# Patient Record
Sex: MALE | Race: WHITE | NOT HISPANIC OR LATINO | Employment: FULL TIME | URBAN - METROPOLITAN AREA
[De-identification: names, ages, dates, MRNs, and addresses within clinical notes are randomized per-mention and may not be internally consistent; named-entity substitution may affect disease eponyms.]

---

## 2023-06-26 ENCOUNTER — LAB (OUTPATIENT)
Dept: LAB | Facility: LAB | Age: 63
End: 2023-06-26
Payer: COMMERCIAL

## 2023-06-26 ENCOUNTER — OFFICE VISIT (OUTPATIENT)
Dept: PRIMARY CARE | Facility: CLINIC | Age: 63
End: 2023-06-26
Payer: COMMERCIAL

## 2023-06-26 VITALS
DIASTOLIC BLOOD PRESSURE: 76 MMHG | SYSTOLIC BLOOD PRESSURE: 124 MMHG | HEIGHT: 68 IN | BODY MASS INDEX: 18.16 KG/M2 | WEIGHT: 119.8 LBS

## 2023-06-26 DIAGNOSIS — E78.5 HYPERLIPIDEMIA, UNSPECIFIED HYPERLIPIDEMIA TYPE: ICD-10-CM

## 2023-06-26 DIAGNOSIS — E03.9 HYPOTHYROIDISM, UNSPECIFIED TYPE: ICD-10-CM

## 2023-06-26 DIAGNOSIS — J44.9 CHRONIC OBSTRUCTIVE PULMONARY DISEASE, UNSPECIFIED COPD TYPE (MULTI): ICD-10-CM

## 2023-06-26 DIAGNOSIS — G61.0: ICD-10-CM

## 2023-06-26 PROBLEM — F10.21 ALCOHOL USE DISORDER, SEVERE, IN SUSTAINED REMISSION (MULTI): Status: ACTIVE | Noted: 2023-06-26

## 2023-06-26 PROBLEM — I82.409 DVT (DEEP VENOUS THROMBOSIS) (MULTI): Status: ACTIVE | Noted: 2023-06-26

## 2023-06-26 PROBLEM — H25.012 CORTICAL AGE-RELATED CATARACT OF LEFT EYE: Status: ACTIVE | Noted: 2023-06-26

## 2023-06-26 PROBLEM — R26.81 UNSTEADY GAIT: Status: ACTIVE | Noted: 2023-06-26

## 2023-06-26 PROBLEM — R05.9 COUGH: Status: ACTIVE | Noted: 2023-06-26

## 2023-06-26 PROBLEM — R09.02 HYPOXIA: Status: ACTIVE | Noted: 2023-06-26

## 2023-06-26 PROBLEM — J31.0 CHRONIC RHINITIS: Status: ACTIVE | Noted: 2023-06-26

## 2023-06-26 PROBLEM — T14.8XXA NERVE DAMAGE: Status: ACTIVE | Noted: 2023-06-26

## 2023-06-26 PROBLEM — H25.12 AGE-RELATED NUCLEAR CATARACT OF LEFT EYE: Status: ACTIVE | Noted: 2023-06-26

## 2023-06-26 PROBLEM — M79.89 LEG SWELLING: Status: ACTIVE | Noted: 2023-06-26

## 2023-06-26 PROBLEM — R53.0 NEOPLASTIC MALIGNANT RELATED FATIGUE: Status: ACTIVE | Noted: 2023-06-26

## 2023-06-26 PROBLEM — J98.6 DIAPHRAGM DYSFUNCTION: Status: ACTIVE | Noted: 2023-06-26

## 2023-06-26 PROBLEM — H90.3 SENSORINEURAL HEARING LOSS OF BOTH EARS: Status: ACTIVE | Noted: 2023-06-26

## 2023-06-26 PROBLEM — H93.19 SUBJECTIVE TINNITUS: Status: ACTIVE | Noted: 2023-06-26

## 2023-06-26 PROBLEM — F15.90 STIMULANT USE DISORDER: Status: ACTIVE | Noted: 2023-06-26

## 2023-06-26 PROBLEM — F41.9 ANXIETY DISORDER: Status: ACTIVE | Noted: 2023-06-26

## 2023-06-26 PROBLEM — H25.042 POSTERIOR SUBCAPSULAR POLAR AGE-RELATED CATARACT OF LEFT EYE: Status: ACTIVE | Noted: 2023-06-26

## 2023-06-26 PROBLEM — F17.200 NICOTINE DEPENDENCE: Status: ACTIVE | Noted: 2023-06-26

## 2023-06-26 PROBLEM — Z96.1 PSEUDOPHAKIA OF LEFT EYE: Status: ACTIVE | Noted: 2023-06-26

## 2023-06-26 PROBLEM — C34.90 LUNG CANCER (MULTI): Status: ACTIVE | Noted: 2023-06-26

## 2023-06-26 PROBLEM — F90.1 ATTENTION DEFICIT HYPERACTIVITY DISORDER (ADHD), PREDOMINANTLY HYPERACTIVE TYPE: Status: ACTIVE | Noted: 2023-06-26

## 2023-06-26 PROBLEM — F39 MOOD DISORDER (CMS-HCC): Status: ACTIVE | Noted: 2023-06-26

## 2023-06-26 PROBLEM — Z85.118 HISTORY OF LUNG CANCER: Status: ACTIVE | Noted: 2023-06-26

## 2023-06-26 PROBLEM — R91.1 LUNG NODULE: Status: ACTIVE | Noted: 2023-06-26

## 2023-06-26 PROBLEM — J98.4 RESTRICTIVE LUNG DISEASE: Status: ACTIVE | Noted: 2023-06-26

## 2023-06-26 PROBLEM — F14.10 COCAINE USE DISORDER (MULTI): Status: ACTIVE | Noted: 2023-06-26

## 2023-06-26 PROBLEM — G62.9 NEUROPATHY: Status: ACTIVE | Noted: 2023-06-26

## 2023-06-26 PROBLEM — H90.3 ASYMMETRICAL SENSORINEURAL HEARING LOSS: Status: ACTIVE | Noted: 2023-06-26

## 2023-06-26 PROBLEM — H17.9 CORNEAL SCAR, LEFT EYE: Status: ACTIVE | Noted: 2023-06-26

## 2023-06-26 PROBLEM — F11.90 OPIOID USE DISORDER: Status: ACTIVE | Noted: 2023-06-26

## 2023-06-26 PROBLEM — Z90.2 STATUS POST PNEUMONECTOMY: Status: ACTIVE | Noted: 2023-06-26

## 2023-06-26 PROBLEM — F15.10 METHAMPHETAMINE ABUSE (MULTI): Status: ACTIVE | Noted: 2023-06-26

## 2023-06-26 LAB
ALANINE AMINOTRANSFERASE (SGPT) (U/L) IN SER/PLAS: 22 U/L (ref 10–52)
ALBUMIN (G/DL) IN SER/PLAS: 4 G/DL (ref 3.4–5)
ALKALINE PHOSPHATASE (U/L) IN SER/PLAS: 113 U/L (ref 33–136)
ANION GAP IN SER/PLAS: 13 MMOL/L (ref 10–20)
APPEARANCE, URINE: CLEAR
ASPARTATE AMINOTRANSFERASE (SGOT) (U/L) IN SER/PLAS: 28 U/L (ref 9–39)
BASOPHILS (10*3/UL) IN BLOOD BY AUTOMATED COUNT: 0.06 X10E9/L (ref 0–0.1)
BASOPHILS/100 LEUKOCYTES IN BLOOD BY AUTOMATED COUNT: 1.1 % (ref 0–2)
BILIRUBIN TOTAL (MG/DL) IN SER/PLAS: 0.5 MG/DL (ref 0–1.2)
BILIRUBIN, URINE: NEGATIVE
BLOOD, URINE: NEGATIVE
CALCIUM (MG/DL) IN SER/PLAS: 9.1 MG/DL (ref 8.6–10.6)
CARBON DIOXIDE, TOTAL (MMOL/L) IN SER/PLAS: 29 MMOL/L (ref 21–32)
CHLORIDE (MMOL/L) IN SER/PLAS: 103 MMOL/L (ref 98–107)
CHOLESTEROL (MG/DL) IN SER/PLAS: 168 MG/DL (ref 0–199)
CHOLESTEROL IN HDL (MG/DL) IN SER/PLAS: 59.3 MG/DL
CHOLESTEROL/HDL RATIO: 2.8
COLOR, URINE: YELLOW
CREATININE (MG/DL) IN SER/PLAS: 1.05 MG/DL (ref 0.5–1.3)
EOSINOPHILS (10*3/UL) IN BLOOD BY AUTOMATED COUNT: 0.16 X10E9/L (ref 0–0.7)
EOSINOPHILS/100 LEUKOCYTES IN BLOOD BY AUTOMATED COUNT: 2.9 % (ref 0–6)
ERYTHROCYTE DISTRIBUTION WIDTH (RATIO) BY AUTOMATED COUNT: 13.1 % (ref 11.5–14.5)
ERYTHROCYTE MEAN CORPUSCULAR HEMOGLOBIN CONCENTRATION (G/DL) BY AUTOMATED: 32.9 G/DL (ref 32–36)
ERYTHROCYTE MEAN CORPUSCULAR VOLUME (FL) BY AUTOMATED COUNT: 92 FL (ref 80–100)
ERYTHROCYTES (10*6/UL) IN BLOOD BY AUTOMATED COUNT: 4.38 X10E12/L (ref 4.5–5.9)
GFR MALE: 79 ML/MIN/1.73M2
GLUCOSE (MG/DL) IN SER/PLAS: 92 MG/DL (ref 74–99)
GLUCOSE, URINE: NEGATIVE MG/DL
HEMATOCRIT (%) IN BLOOD BY AUTOMATED COUNT: 40.1 % (ref 41–52)
HEMOGLOBIN (G/DL) IN BLOOD: 13.2 G/DL (ref 13.5–17.5)
IMMATURE GRANULOCYTES/100 LEUKOCYTES IN BLOOD BY AUTOMATED COUNT: 0.5 % (ref 0–0.9)
KETONES, URINE: NEGATIVE MG/DL
LEUKOCYTE ESTERASE, URINE: NEGATIVE
LEUKOCYTES (10*3/UL) IN BLOOD BY AUTOMATED COUNT: 5.5 X10E9/L (ref 4.4–11.3)
LYMPHOCYTES (10*3/UL) IN BLOOD BY AUTOMATED COUNT: 1.62 X10E9/L (ref 1.2–4.8)
LYMPHOCYTES/100 LEUKOCYTES IN BLOOD BY AUTOMATED COUNT: 29.4 % (ref 13–44)
MONOCYTES (10*3/UL) IN BLOOD BY AUTOMATED COUNT: 0.47 X10E9/L (ref 0.1–1)
MONOCYTES/100 LEUKOCYTES IN BLOOD BY AUTOMATED COUNT: 8.5 % (ref 2–10)
NEUTROPHILS (10*3/UL) IN BLOOD BY AUTOMATED COUNT: 3.17 X10E9/L (ref 1.2–7.7)
NEUTROPHILS/100 LEUKOCYTES IN BLOOD BY AUTOMATED COUNT: 57.6 % (ref 40–80)
NITRITE, URINE: NEGATIVE
NON-HDL CHOLESTEROL: 109 MG/DL
NRBC (PER 100 WBCS) BY AUTOMATED COUNT: 0 /100 WBC (ref 0–0)
PH, URINE: 6 (ref 5–8)
PLATELETS (10*3/UL) IN BLOOD AUTOMATED COUNT: 203 X10E9/L (ref 150–450)
POTASSIUM (MMOL/L) IN SER/PLAS: 4.4 MMOL/L (ref 3.5–5.3)
PROTEIN TOTAL: 6.8 G/DL (ref 6.4–8.2)
PROTEIN, URINE: NEGATIVE MG/DL
SODIUM (MMOL/L) IN SER/PLAS: 141 MMOL/L (ref 136–145)
SPECIFIC GRAVITY, URINE: 1.02 (ref 1–1.03)
THYROTROPIN (MIU/L) IN SER/PLAS BY DETECTION LIMIT <= 0.05 MIU/L: 1.93 MIU/L (ref 0.44–3.98)
UREA NITROGEN (MG/DL) IN SER/PLAS: 14 MG/DL (ref 6–23)
UROBILINOGEN, URINE: <2 MG/DL (ref 0–1.9)

## 2023-06-26 PROCEDURE — 85025 COMPLETE CBC W/AUTO DIFF WBC: CPT

## 2023-06-26 PROCEDURE — 99213 OFFICE O/P EST LOW 20 MIN: CPT | Performed by: INTERNAL MEDICINE

## 2023-06-26 PROCEDURE — 81003 URINALYSIS AUTO W/O SCOPE: CPT

## 2023-06-26 PROCEDURE — 80053 COMPREHEN METABOLIC PANEL: CPT

## 2023-06-26 PROCEDURE — 84443 ASSAY THYROID STIM HORMONE: CPT

## 2023-06-26 PROCEDURE — 82465 ASSAY BLD/SERUM CHOLESTEROL: CPT

## 2023-06-26 PROCEDURE — 36415 COLL VENOUS BLD VENIPUNCTURE: CPT

## 2023-06-26 PROCEDURE — 83718 ASSAY OF LIPOPROTEIN: CPT

## 2023-06-26 RX ORDER — LORATADINE 10 MG/1
1 TABLET ORAL DAILY
COMMUNITY
Start: 2021-07-28

## 2023-06-26 RX ORDER — FLUTICASONE PROPIONATE 50 MCG
1 SPRAY, SUSPENSION (ML) NASAL DAILY
COMMUNITY
Start: 2021-07-28

## 2023-06-26 RX ORDER — FLUTICASONE FUROATE, UMECLIDINIUM BROMIDE AND VILANTEROL TRIFENATATE 100; 62.5; 25 UG/1; UG/1; UG/1
POWDER RESPIRATORY (INHALATION)
COMMUNITY
Start: 2020-02-08

## 2023-06-26 RX ORDER — OLANZAPINE 5 MG/1
1 TABLET ORAL DAILY
COMMUNITY
End: 2023-06-26 | Stop reason: SDUPTHER

## 2023-06-26 RX ORDER — ALFUZOSIN HYDROCHLORIDE 10 MG/1
10 TABLET, EXTENDED RELEASE ORAL DAILY
Qty: 90 TABLET | Refills: 1 | Status: SHIPPED | OUTPATIENT
Start: 2023-06-26

## 2023-06-26 RX ORDER — ALFUZOSIN HYDROCHLORIDE 10 MG/1
10 TABLET, EXTENDED RELEASE ORAL DAILY
COMMUNITY
Start: 2023-06-07 | End: 2023-06-26 | Stop reason: SDUPTHER

## 2023-06-26 RX ORDER — ALBUTEROL SULFATE 90 UG/1
AEROSOL, METERED RESPIRATORY (INHALATION)
COMMUNITY

## 2023-06-26 RX ORDER — OLANZAPINE 5 MG/1
5 TABLET ORAL DAILY
Qty: 90 TABLET | Refills: 1 | Status: SHIPPED | OUTPATIENT
Start: 2023-06-26

## 2023-06-26 NOTE — PROGRESS NOTES
OFFICE NOTE    NAME OF THE PATIENT: Joseph Hair    YOB: 1960    CHIEF COMPLAINT:  This gentleman today came here for follow-up on various conditions.  He wants a refill on medication.  Appetite and weight are okay.  No problem.  He came for follow-up.    PAST MEDICAL HISTORY:  Reviewed on EMR, unchanged.    CURRENT MEDICATIONS:  Reviewed on EMR, unchanged.  List reviewed.    ALLERGIES:  Reviewed on EMR, unchanged.    SOCIAL HISTORY:  Reviewed on EMR, unchanged.  He smokes.  No history of alcohol or drug abuse.    FAMILY HISTORY:  Reviewed on EMR, unchanged.    REVIEW OF SYSTEMS:  All 12 systems reviewed and pertaining covered in history and physical.    PHYSICAL EXAMINATION  VITAL SIGNS:  As recorded and reviewed from EMR.  RESPIRATORY:  The patient had normal inspirations and expirations.  The breath sounds were equal bilaterally and clear to auscultation.  CARDIOVASCULAR:  The patient had S1 normal, split S2 without obvious rubs, clicks, or murmurs.    GASTROINTESTINAL:  There was no hepatosplenomegaly.  There were no palpable masses and no inguinal nodes.  EXTREMITIES:  Legs had no edema.  NEUROLOGIC:  The patient had normal cranial nerves.  The reflexes, sensory, and motor examination were grossly within normal limits.    LAB WORK:  Laboratory testing discussed.    ASSESSMENT AND PLAN:  BPH, on medication.  We will check liver.  Anxiety and depression, on medications.  Smoking.  Today, I have discussed with the patient about smoking cessation in detail.  Discussed the bad consequences of smoking, which can even result into death ultimately.  I advised him to quit smoking.  I also offered help in the form of counseling, Nicoderm Patches, Zyban prescription drug, and hypnosis, and discussed the different pros and cons of trying this therapy.  The patient made the promise that he will make a serious attempt.  If he decides to have prescription medication Zyban, he will discuss with me.  Advised  "to quit.  Prostate health.  PSA.  Blood work ordered.  Follow-up in a week after testing.    Kindly review this note in conjunction with EMR.     Subjective   Patient ID: Joseph Hair is a 62 y.o. male who presents for Med Refill.      HPI    Review of Systems    Objective   /76   Ht 1.727 m (5' 8\")   Wt 54.3 kg (119 lb 12.8 oz)   BMI 18.22 kg/m²       Physical Exam    Assessment/Plan   Problem List Items Addressed This Visit    None        "

## 2023-06-30 ENCOUNTER — TELEPHONE (OUTPATIENT)
Dept: PRIMARY CARE | Facility: CLINIC | Age: 63
End: 2023-06-30
Payer: COMMERCIAL

## 2023-07-07 ENCOUNTER — OFFICE VISIT (OUTPATIENT)
Dept: PRIMARY CARE | Facility: CLINIC | Age: 63
End: 2023-07-07
Payer: COMMERCIAL

## 2023-07-07 VITALS
SYSTOLIC BLOOD PRESSURE: 120 MMHG | HEIGHT: 68 IN | BODY MASS INDEX: 18.19 KG/M2 | WEIGHT: 120 LBS | DIASTOLIC BLOOD PRESSURE: 82 MMHG

## 2023-07-07 DIAGNOSIS — D64.9 ANEMIA, UNSPECIFIED TYPE: ICD-10-CM

## 2023-07-07 PROCEDURE — 99213 OFFICE O/P EST LOW 20 MIN: CPT | Performed by: INTERNAL MEDICINE

## 2023-07-07 NOTE — PROGRESS NOTES
"OFFICE NOTE    NAME OF THE PATIENT: Joseph Hair    YOB: 1960    CHIEF COMPLAINT:  Joseph Hair today came here for multiple medical issues.  Overall, he is a happy person.  Appetite and weight are okay.  He sees Dr. De Souza for prostate.  His urinary symptoms are improved since he started medication.    PAST MEDICAL HISTORY:  Reviewed on EMR, unchanged.    CURRENT MEDICATIONS:  Reviewed on EMR, unchanged.  List reviewed.    ALLERGIES:  Reviewed on EMR, unchanged.    SOCIAL HISTORY:  Reviewed on EMR, unchanged.  He does not smoke and does not drink alcohol.    FAMILY HISTORY:  Reviewed on EMR, unchanged.    REVIEW OF SYSTEMS:  All 12 systems reviewed and pertaining covered in history and physical.    PHYSICAL EXAMINATION  VITAL SIGNS:  As recorded and reviewed from EMR.  RESPIRATORY:  The patient had normal inspirations and expirations.  The breath sounds were equal bilaterally and clear to auscultation.  CARDIOVASCULAR:  The patient had S1 normal, split S2 without obvious rubs, clicks, or murmurs.    GASTROINTESTINAL:  There was no hepatosplenomegaly.  There were no palpable masses and no inguinal nodes.  EXTREMITIES:  Legs had no edema.  NEUROLOGIC:  The patient had normal cranial nerves.  The reflexes, sensory, and motor examination were grossly within normal limits.    LAB WORK:  Laboratory testing discussed.    ASSESSMENT AND PLAN:  BPH, improvement, but not completely, but he will talk to the urologist.  Anemia.  Anemia was better.  Now anemia again.  Recent colonoscopy.  Refer to blood specialist.  COPD, on breathing treatment.  Hypoxia, on oxygen.  Cholesterol.  Monitor.  I shall see him in six weeks with repeat blood work.  I urged him to see the hematologist.      Kindly review this note in conjunction with EMR.   Subjective   Patient ID: Joseph Hair is a 62 y.o. male who presents for Follow-up.      HPI    Review of Systems    Objective   /82   Ht 1.727 m (5' 8\")   Wt " 54.4 kg (120 lb)   BMI 18.25 kg/m²       Physical Exam    Assessment/Plan   Problem List Items Addressed This Visit    None

## 2023-10-04 ENCOUNTER — APPOINTMENT (OUTPATIENT)
Dept: RADIOLOGY | Facility: HOSPITAL | Age: 63
End: 2023-10-04
Payer: COMMERCIAL

## 2023-10-04 ENCOUNTER — HOSPITAL ENCOUNTER (EMERGENCY)
Facility: HOSPITAL | Age: 63
Discharge: HOME | End: 2023-10-04
Attending: EMERGENCY MEDICINE
Payer: COMMERCIAL

## 2023-10-04 VITALS
HEIGHT: 68 IN | DIASTOLIC BLOOD PRESSURE: 77 MMHG | BODY MASS INDEX: 18.94 KG/M2 | OXYGEN SATURATION: 97 % | WEIGHT: 125 LBS | RESPIRATION RATE: 16 BRPM | HEART RATE: 76 BPM | SYSTOLIC BLOOD PRESSURE: 113 MMHG | TEMPERATURE: 97.2 F

## 2023-10-04 DIAGNOSIS — R55 VASOVAGAL SYNCOPE: Primary | ICD-10-CM

## 2023-10-04 DIAGNOSIS — I95.1 ORTHOSTATIC HYPOTENSION: ICD-10-CM

## 2023-10-04 DIAGNOSIS — E86.0 DEHYDRATION: ICD-10-CM

## 2023-10-04 LAB
ALBUMIN SERPL-MCNC: 3.8 G/DL (ref 3.5–5)
ALP BLD-CCNC: 99 U/L (ref 35–125)
ALT SERPL-CCNC: 15 U/L (ref 5–40)
ANION GAP SERPL CALC-SCNC: 10 MMOL/L
APPEARANCE UR: CLEAR
AST SERPL-CCNC: 22 U/L (ref 5–40)
BILIRUB DIRECT SERPL-MCNC: 0.3 MG/DL (ref 0–0.2)
BILIRUB SERPL-MCNC: 1 MG/DL (ref 0.1–1.2)
BILIRUB UR STRIP.AUTO-MCNC: NEGATIVE MG/DL
BUN SERPL-MCNC: 17 MG/DL (ref 8–25)
CALCIUM SERPL-MCNC: 9 MG/DL (ref 8.5–10.4)
CHLORIDE SERPL-SCNC: 101 MMOL/L (ref 97–107)
CO2 SERPL-SCNC: 26 MMOL/L (ref 24–31)
COLOR UR: YELLOW
CREAT SERPL-MCNC: 1.4 MG/DL (ref 0.4–1.6)
ERYTHROCYTE [DISTWIDTH] IN BLOOD BY AUTOMATED COUNT: 13.4 % (ref 11.5–14.5)
GFR SERPL CREATININE-BSD FRML MDRD: 56 ML/MIN/1.73M*2
GLUCOSE SERPL-MCNC: 138 MG/DL (ref 65–99)
GLUCOSE UR STRIP.AUTO-MCNC: NORMAL MG/DL
HCT VFR BLD AUTO: 38.8 % (ref 41–52)
HGB BLD-MCNC: 12.8 G/DL (ref 13.5–17.5)
HYALINE CASTS #/AREA URNS AUTO: ABNORMAL /LPF
KETONES UR STRIP.AUTO-MCNC: NEGATIVE MG/DL
LEUKOCYTE ESTERASE UR QL STRIP.AUTO: NEGATIVE
MCH RBC QN AUTO: 29.4 PG (ref 26–34)
MCHC RBC AUTO-ENTMCNC: 33 G/DL (ref 32–36)
MCV RBC AUTO: 89 FL (ref 80–100)
NITRITE UR QL STRIP.AUTO: NEGATIVE
NRBC BLD-RTO: 0 /100 WBCS (ref 0–0)
PH UR STRIP.AUTO: 6.5 [PH]
PLATELET # BLD AUTO: 169 X10*3/UL (ref 150–450)
PMV BLD AUTO: 9.5 FL (ref 7.5–11.5)
POTASSIUM SERPL-SCNC: 4.8 MMOL/L (ref 3.4–5.1)
PROT SERPL-MCNC: 6.9 G/DL (ref 5.9–7.9)
PROT UR STRIP.AUTO-MCNC: ABNORMAL MG/DL
RBC # BLD AUTO: 4.35 X10*6/UL (ref 4.5–5.9)
RBC # UR STRIP.AUTO: NEGATIVE /UL
RBC #/AREA URNS AUTO: ABNORMAL /HPF
SODIUM SERPL-SCNC: 137 MMOL/L (ref 133–145)
SP GR UR STRIP.AUTO: 1.02
TROPONIN T SERPL-MCNC: 19 NG/L
TROPONIN T SERPL-MCNC: 20 NG/L
UROBILINOGEN UR STRIP.AUTO-MCNC: NORMAL MG/DL
WBC # BLD AUTO: 11.9 X10*3/UL (ref 4.4–11.3)
WBC #/AREA URNS AUTO: ABNORMAL /HPF

## 2023-10-04 PROCEDURE — 72220 X-RAY EXAM SACRUM TAILBONE: CPT | Mod: FY

## 2023-10-04 PROCEDURE — 99284 EMERGENCY DEPT VISIT MOD MDM: CPT | Performed by: EMERGENCY MEDICINE

## 2023-10-04 PROCEDURE — 82040 ASSAY OF SERUM ALBUMIN: CPT | Performed by: EMERGENCY MEDICINE

## 2023-10-04 PROCEDURE — 82248 BILIRUBIN DIRECT: CPT | Performed by: EMERGENCY MEDICINE

## 2023-10-04 PROCEDURE — 2500000001 HC RX 250 WO HCPCS SELF ADMINISTERED DRUGS (ALT 637 FOR MEDICARE OP): Performed by: EMERGENCY MEDICINE

## 2023-10-04 PROCEDURE — 84484 ASSAY OF TROPONIN QUANT: CPT | Performed by: EMERGENCY MEDICINE

## 2023-10-04 PROCEDURE — 36415 COLL VENOUS BLD VENIPUNCTURE: CPT | Performed by: EMERGENCY MEDICINE

## 2023-10-04 PROCEDURE — 2500000004 HC RX 250 GENERAL PHARMACY W/ HCPCS (ALT 636 FOR OP/ED): Performed by: EMERGENCY MEDICINE

## 2023-10-04 PROCEDURE — 84460 ALANINE AMINO (ALT) (SGPT): CPT | Performed by: EMERGENCY MEDICINE

## 2023-10-04 PROCEDURE — 71045 X-RAY EXAM CHEST 1 VIEW: CPT | Mod: FY

## 2023-10-04 PROCEDURE — 85027 COMPLETE CBC AUTOMATED: CPT | Performed by: EMERGENCY MEDICINE

## 2023-10-04 PROCEDURE — 81001 URINALYSIS AUTO W/SCOPE: CPT | Performed by: EMERGENCY MEDICINE

## 2023-10-04 RX ORDER — OLANZAPINE 5 MG/1
5 TABLET ORAL NIGHTLY
COMMUNITY

## 2023-10-04 RX ORDER — ALFUZOSIN HYDROCHLORIDE 10 MG/1
10 TABLET, EXTENDED RELEASE ORAL DAILY
COMMUNITY

## 2023-10-04 RX ORDER — ACETAMINOPHEN 325 MG/1
650 TABLET ORAL ONCE
Status: COMPLETED | OUTPATIENT
Start: 2023-10-04 | End: 2023-10-04

## 2023-10-04 RX ORDER — KETOROLAC TROMETHAMINE 30 MG/ML
15 INJECTION, SOLUTION INTRAMUSCULAR; INTRAVENOUS ONCE
Status: COMPLETED | OUTPATIENT
Start: 2023-10-04 | End: 2023-10-04

## 2023-10-04 RX ADMIN — KETOROLAC TROMETHAMINE 15 MG: 30 INJECTION, SOLUTION INTRAMUSCULAR; INTRAVENOUS at 06:50

## 2023-10-04 RX ADMIN — ACETAMINOPHEN 650 MG: 325 TABLET, FILM COATED ORAL at 06:51

## 2023-10-04 RX ADMIN — SODIUM CHLORIDE 1000 ML: 900 INJECTION, SOLUTION INTRAVENOUS at 06:50

## 2023-10-04 RX ADMIN — SODIUM CHLORIDE 1000 ML: 900 INJECTION, SOLUTION INTRAVENOUS at 10:17

## 2023-10-04 ASSESSMENT — LIFESTYLE VARIABLES
EVER HAD A DRINK FIRST THING IN THE MORNING TO STEADY YOUR NERVES TO GET RID OF A HANGOVER: NO
HAVE PEOPLE ANNOYED YOU BY CRITICIZING YOUR DRINKING: NO
EVER FELT BAD OR GUILTY ABOUT YOUR DRINKING: NO
HAVE YOU EVER FELT YOU SHOULD CUT DOWN ON YOUR DRINKING: NO

## 2023-10-04 ASSESSMENT — PAIN - FUNCTIONAL ASSESSMENT
PAIN_FUNCTIONAL_ASSESSMENT: 0-10
PAIN_FUNCTIONAL_ASSESSMENT: 0-10

## 2023-10-04 ASSESSMENT — PAIN SCALES - GENERAL
PAINLEVEL_OUTOF10: 0 - NO PAIN
PAINLEVEL_OUTOF10: 8
PAINLEVEL_OUTOF10: 10 - WORST POSSIBLE PAIN

## 2023-10-04 ASSESSMENT — PAIN DESCRIPTION - PROGRESSION: CLINICAL_PROGRESSION: GRADUALLY IMPROVING

## 2023-10-04 ASSESSMENT — COLUMBIA-SUICIDE SEVERITY RATING SCALE - C-SSRS
6. HAVE YOU EVER DONE ANYTHING, STARTED TO DO ANYTHING, OR PREPARED TO DO ANYTHING TO END YOUR LIFE?: NO
2. HAVE YOU ACTUALLY HAD ANY THOUGHTS OF KILLING YOURSELF?: NO
1. IN THE PAST MONTH, HAVE YOU WISHED YOU WERE DEAD OR WISHED YOU COULD GO TO SLEEP AND NOT WAKE UP?: NO

## 2023-10-04 NOTE — ED PROVIDER NOTES
EMERGENCY DEPARTMENT ENCOUNTER      [ ] CODE STEMI [ ] CODE Neuro [ ] CODE Yellow [ ] Modified Trauma [ ] CODE Blue      CHIEF COMPLAINT      Chief Complaint   Patient presents with    Syncope     Patient reports he passed out last night in the garage and again around 0530 this morning. Patient states he felt like the room was getting dark, felt dizzy, got sweaty, and hit the floor. At time of triage, patient is alert and oriented x4.       Mode of Arrival:  Primary Care Provider: Fredrick Call MD  Medical Record Number: [id number]      History obtained by: Patient  Limited by nothing  Time seen: @NOWtimed@      QUALITY MEASURES   PPE Utilized: N95 with goggles and gloves      HPI      Joseph Hair is a 62 y.o. male with a history of right lobectomy due to cancer, COPD, COVID infection in the past, chemotherapy in the past, self described history of syncope in the past as well, states that he had been told by his children that he does not drink enough water so had an episode yesterday while he was in the garage of syncope where he may have hit his head in the afternoon however did not go to the hospital at the time.  However woke up this morning morning early   to go urinate due to his BPH and states he had another episode while walking to the bathroom never hitting his head or falling onto his tailbone and waking up immediately.  Complaining of tailbone pain.  Otherwise denies any headache.  Feels dehydrated and should be drinking more fluids.  Has not been ill lately with any fever chills cough or sore throat or chest pain or trouble breathing and states that prior to the episodes he does get lightheadedness nausea fatigue and tunnel vision.  Denies any abdominal pain or dysuria of late.  No other complaints.      Patient otherwise denies fever, chills, n/v/d, chest pain, shortness of breath, sore throat, cough, rhinorrhea, abdominal pain, dysuria, hematuria, swollen extremities or skin changes, hematemesis,  hematochezia, melena or any other accompanying symptoms of late.      The patient has no other complaints at this time.               PAST MEDICAL HISTORY    Past Medical History:   Diagnosis Date    Chronic obstructive pulmonary disease, unspecified (CMS/HCC)     Chronic obstructive pulmonary disease    COVID-19 11/15/2021    COVID-19 virus infection    Drug induced akathisia 09/20/2019    Akathisia    Drug induced subacute dyskinesia 09/20/2019    Tardive dyskinesia    Emphysema, unspecified (CMS/HCC)     Emphysema    Other fatigue 11/17/2020    Chemotherapy-induced fatigue    Personal history of other diseases of the respiratory system     History of sinusitis    Personal history of other diseases of the respiratory system 04/13/2020    History of acute bronchitis    Personal history of other diseases of the respiratory system     History of chronic bronchitis    Personal history of other malignant neoplasm of bronchus and lung     History of malignant neoplasm of lung    Personal history of other mental and behavioral disorders     History of attention deficit hyperactivity disorder    Unspecified injury of left eye and orbit, initial encounter     Left eye trauma         I have personally reviewed the patient's past medical history in the records.  Kenneth Strauss MD    SURGICAL HISTORY    Past Surgical History:   Procedure Laterality Date    EYE SURGERY  01/27/2015    Eye Surgery    OTHER SURGICAL HISTORY  10/24/2013    Bronchoscopy (Diagnostic)    US GUIDED BIOPSY LYMPH NODE SUPERFICIAL  11/6/2014    US GUIDED BIOPSY LYMPH NODE SUPERFICIAL [place]6/2014 [facility] [place] [facility]       I have personally reviewed the patient's past surgical history in the records.  Kenneth Strauss MD    CURRENT MEDICATIONS    I have reviewed the patient’s medications.   Please see nursing and pharmacy records for the most up to date list.     [unfilled]    ALLERGIES    No Known Allergies      I have personally reviewed the patient's past history of  "allergies in the records.  Kenneth Strauss MD    FAMILY HISTORY    No family history on file.    I have personally reviewed the patient's family history in the records.  Kenneth Strauss MD    SOCIAL HISTORY    Social History     Socioeconomic History    Marital status:      Spouse name: None    Number of children: None    Years of education: None    Highest education level: None   Occupational History    None   Tobacco Use    Smoking status: Every Day     Packs/day: 1     Types: Cigarettes    Smokeless tobacco: Never   Substance and Sexual Activity    Alcohol use: Never    Drug use: Never    Sexual activity: None   Other Topics Concern    None   Social History Narrative    None     Social Determinants of Health     Financial Resource Strain: Not on file   Food Insecurity: Not on file   Transportation Needs: Not on file   Physical Activity: Not on file   Stress: Not on file   Social Connections: Not on file   Intimate Partner Violence: Not on file   Housing Stability: Not on file         I have personally reviewed the patient's social history in the records.  Kenneth Strauss MD    REVIEW OF SYSTEMS      14 point ROS was reviewed and negative except as noted above in HPI.      PHYSICAL EXAM    VITAL SIGNS:    BP 99/73   Pulse 73   Temp 36.2 °C (97.1 °F)   Resp 14   Ht 1.727 m (5' 8\")   Wt 56.7 kg (125 lb)   SpO2 96%   BMI 19.01 kg/m²    Review EMR for vital signs  Nursing note and vitals reviewed.    Constitutional:  Alert and oriented, well-developed, well-nourished, appears stated age, non-toxic appearing  HENT:  Normocephalic, atraumatic, bilateral external ears normal, oropharynx moist, Nose normal.  Neck: normal range of motion, no tenderness, supple, no stridor.  Eyes:  PERRL, EOMI, conjunctiva normal, no discharge.   Cardiovascular:  Normal heart rate, normal rhythm, no murmurs, no rubs, no gallops.   Respiratory:  Normal breath sounds, no respiratory distress, no wheezing, no chest wall tenderness.   GI:  " Bowel sounds normal, soft, no tenderness, no rebound or guarding, no distention, no masses pulsatile or otherwise   (any female  exam was done with female chaperone present):   Deferred  Integument:  Warm, dry, no erythema, no rash, no edema.   Back:  No midline tenderness, no CVA tenderness.  No discernible tailbone tenderness  Musculoskeletal:  Intact distal pulses, no tenderness, no cyanosis, no clubbing, with capillary refill less than 2 seconds. Good range of motion in all major joints. No tenderness to palpation or major deformities noted.    Neurologic:  Alert & oriented x 3, normal motor function, normal sensory function, no focal deficits noted. Cranial nerves II-XII intact.  Psychiatric:  Affect normal, judgment normal, mood normal.     EKG    Performed at  629  , HR of   91 ,     NSR, NAD, no sign of STEMI or NSTEMI, no Q wave or T wave abnormality noted.    Reviewed and interpreted by me at time performed      Reviewed and interpreted by me, Kenneth Strauss MD        CARDIAC MONITORING    Cardiac monitor reveals normal sinus rhythm as interpreted by me.   Cardiac monitor was ordered secondary to patient's history of chest pain/palpitations/near-syncope/syncope/sob/stroke and to monitor the patient for dysrhythmia.      RADIOLOGY  XR chest 1 view   Final Result   1. No acute cardiopulmonary process.   2. Right pneumonectomy changes.        MACRO:   None        Signed by: Ciro Stroud 10/4/2023 8:30 AM   Dictation workstation:   [id number]      XR sacrum coccyx 2+ views   Final Result   No acute bony abnormality noted.        MACRO:   None.        Signed by: Franklin Pedraza 10/4/2023 8:50 AM   Dictation workstation:   [id number]          All Imaging studies evaluated and interpreted by ED physician except when noted otherwise.    ED PROVIDER INTERPRETATION (XRAYS ONLY):       *I have interpreted the x-ray real-time in the ED myself, and made a clinical decision on it prior to the formal radiology  reading.    Kenneth Strauss M.D.    RADIOLOGIST IMPRESSION (U/S, CT, MRI):   XR chest 1 view   Final Result   1. No acute cardiopulmonary process.   2. Right pneumonectomy changes.        MACRO:   None        Signed by: Ciro Stroud 10/4/2023 8:30 AM   Dictation workstation:   [id number]      XR sacrum coccyx 2+ views   Final Result   No acute bony abnormality noted.        MACRO:   None.        Signed by: Franklin Pedraza 10/4/2023 8:50 AM   Dictation workstation:   [id number]            PERTINENT LABS    Please refer to the chart for all lab work and to MDM for relevant discussion.      PROCEDURE    None (procdoc)    ED COURSE & MEDICAL DECISION MAKING    Pertinent Labs & Imaging studies reviewed. (See chart for details)    MDM:    Assessment: Joseph Hair is a 62 y.o.male who presents to the ED with likely vasovagal syncope episode as patient states he has had this in the past but there is obvious head trauma and fall happened over 12 hours ago do not feel that a CT head is warranted but told patient I would provide hydration given that he was noted to be borderline hypotensive at 84/60 although patient is talkative and vigorously moving but will obtain chest x-ray and sacrum x-ray given his complaint of pain provided to medication as well as IV fluid hydration, EKG, CBC chemistry panel function panel BMP troponin urinalysis And ultimately will reassessment patient does show dramatic improvement especially with blood pressure and feels fine with orthostatics will consider discharge.  Patient understands and agrees with plan        Prior records in EPIC reviewed by me.     2023 Coding Requirements:  --Independent historian(s):    see HPI  --Review of prior records:    EHR reviewed   --Relevant comorbidities:    see records  --Social determinants of health:          Syncope   I have considered the following conditions in my assessment of   this patient's condition: Hypotension, syncope, near syncope,  transient loss   of consciousness, abdominal aneurysm acute or rupture, cerebral vascular   accident, transient ischemic attack, GI bleed, acute myocardial infarct, pulmonary   embolus, seizure, subarachnoid hemorrhage, vasovagal syncope, ventricular   tachycardia or arrhythmia.    CBC does not show significant white count with chemistry only showing slight elevation in creatinine, troponin 19, LFTs within normal limits.    Chest x-ray only shows right pneumonectomy changes otherwise no acute findings and sacrum and coccyx x-ray within normal limits.    Patient feeling better after IV fluid and blood pressure now 100/75.  Family member noted at bedside and did review that Flomax can cause orthostatic hypotension, although orthostatics here are within normal limits.  EKG also within normal limits.  After IV fluid and additional troponin the troponin within normal notes will discharge back to family members care.  Family member understands and agrees with plan as well as patient.      Repeat troponin within normal limits.  Will discharge patient to family care with strict return precautions    ED VITALS  Vitals:    10/04/23 0900 10/04/23 0930 10/04/23 1000 10/04/23 1015   BP: 89/71 94/70 99/73    BP Location:       Patient Position:       Pulse: 77 74 76 73   Resp: 21 17 20 14   Temp:       SpO2:  95% 95% 96%   Weight:       Height:           BP  Min: 77/56  Max: 101/65    As part of the 2022 MIPS reporting requirements, the following measures have been reviewed and documented:    None     1. Vasovagal syncope    2. Orthostatic hypotension    3. Dehydration          DISPOSITION       DISCHARGE.  The patient is discharged back to their place of residence.  Discharge diagnosis, instructions and plan were discussed and understood. At the time of discharge the patient was comfortable and was in no apparent distress. Patient is aware of diagnostic uncertainty and was notified though testing is negative here, there is a  very small chance that pathology may be missed.  The patient understands these risks and the patient /family understood to return immediately to the emergency department if the symptoms worsen or if they have any additional concerns.    DISCHARGE MEDICATIONS  New Prescriptions    No medications on file       FOLLOW UP  No follow-up provider specified.    Kenneth Strauss    This note was created with the assistance of voice recognition technology.  While attempts were made to ensure accuracy, mis-transcription may be present due to limitations in the software.        Electronically signed by MD Kenneth Esparza MD  10/04/23 1115

## 2023-10-13 ENCOUNTER — HOSPITAL ENCOUNTER (OUTPATIENT)
Dept: CARDIOLOGY | Facility: HOSPITAL | Age: 63
Discharge: HOME | End: 2023-10-13
Payer: COMMERCIAL

## 2023-10-13 LAB
ATRIAL RATE: 91 BPM
P AXIS: 65 DEGREES
P OFFSET: 176 MS
P ONSET: 133 MS
PR INTERVAL: 180 MS
Q ONSET: 223 MS
QRS COUNT: 15 BEATS
QRS DURATION: 68 MS
QT INTERVAL: 348 MS
QTC CALCULATION(BAZETT): 428 MS
QTC FREDERICIA: 400 MS
R AXIS: 80 DEGREES
T AXIS: 59 DEGREES
T OFFSET: 397 MS
VENTRICULAR RATE: 91 BPM

## 2023-10-13 PROCEDURE — 93005 ELECTROCARDIOGRAM TRACING: CPT

## 2024-02-13 ENCOUNTER — APPOINTMENT (OUTPATIENT)
Dept: PRIMARY CARE | Facility: CLINIC | Age: 64
End: 2024-02-13
Payer: COMMERCIAL

## 2024-02-20 ENCOUNTER — APPOINTMENT (OUTPATIENT)
Dept: PRIMARY CARE | Facility: CLINIC | Age: 64
End: 2024-02-20
Payer: COMMERCIAL

## 2024-07-01 ENCOUNTER — HOSPITAL ENCOUNTER (OUTPATIENT)
Dept: RADIOLOGY | Facility: CLINIC | Age: 64
Discharge: HOME | End: 2024-07-01
Payer: COMMERCIAL

## 2024-07-01 DIAGNOSIS — R91.1 SOLITARY PULMONARY NODULE: ICD-10-CM

## 2024-07-01 PROCEDURE — 71250 CT THORAX DX C-: CPT

## 2024-07-10 ENCOUNTER — OFFICE VISIT (OUTPATIENT)
Dept: HEMATOLOGY/ONCOLOGY | Facility: CLINIC | Age: 64
End: 2024-07-10
Payer: COMMERCIAL

## 2024-07-10 VITALS
RESPIRATION RATE: 16 BRPM | OXYGEN SATURATION: 94 % | DIASTOLIC BLOOD PRESSURE: 71 MMHG | TEMPERATURE: 98.6 F | WEIGHT: 121.36 LBS | HEIGHT: 67 IN | SYSTOLIC BLOOD PRESSURE: 119 MMHG | HEART RATE: 93 BPM | BODY MASS INDEX: 19.05 KG/M2

## 2024-07-10 DIAGNOSIS — Z85.118 ENCOUNTER FOR FOLLOW-UP SURVEILLANCE OF LUNG CANCER: ICD-10-CM

## 2024-07-10 DIAGNOSIS — Z08 ENCOUNTER FOR FOLLOW-UP SURVEILLANCE OF LUNG CANCER: ICD-10-CM

## 2024-07-10 DIAGNOSIS — G61.0: ICD-10-CM

## 2024-07-10 DIAGNOSIS — R91.1 LUNG NODULE: Primary | ICD-10-CM

## 2024-07-10 PROCEDURE — 1126F AMNT PAIN NOTED NONE PRSNT: CPT | Performed by: STUDENT IN AN ORGANIZED HEALTH CARE EDUCATION/TRAINING PROGRAM

## 2024-07-10 PROCEDURE — 99213 OFFICE O/P EST LOW 20 MIN: CPT | Performed by: STUDENT IN AN ORGANIZED HEALTH CARE EDUCATION/TRAINING PROGRAM

## 2024-07-10 PROCEDURE — 3008F BODY MASS INDEX DOCD: CPT | Performed by: STUDENT IN AN ORGANIZED HEALTH CARE EDUCATION/TRAINING PROGRAM

## 2024-07-10 ASSESSMENT — PATIENT HEALTH QUESTIONNAIRE - PHQ9
3. TROUBLE FALLING OR STAYING ASLEEP OR SLEEPING TOO MUCH: NEARLY EVERY DAY
6. FEELING BAD ABOUT YOURSELF - OR THAT YOU ARE A FAILURE OR HAVE LET YOURSELF OR YOUR FAMILY DOWN: NEARLY EVERY DAY
2. FEELING DOWN, DEPRESSED OR HOPELESS: NEARLY EVERY DAY
SUM OF ALL RESPONSES TO PHQ9 QUESTIONS 1 AND 2: 3
5. POOR APPETITE OR OVEREATING: NEARLY EVERY DAY
9. THOUGHTS THAT YOU WOULD BE BETTER OFF DEAD, OR OF HURTING YOURSELF: MORE THAN HALF THE DAYS
SUM OF ALL RESPONSES TO PHQ QUESTIONS 1-9: 23
10. IF YOU CHECKED OFF ANY PROBLEMS, HOW DIFFICULT HAVE THESE PROBLEMS MADE IT FOR YOU TO DO YOUR WORK, TAKE CARE OF THINGS AT HOME, OR GET ALONG WITH OTHER PEOPLE: NOT DIFFICULT AT ALL
7. TROUBLE CONCENTRATING ON THINGS, SUCH AS READING THE NEWSPAPER OR WATCHING TELEVISION: NEARLY EVERY DAY
1. LITTLE INTEREST OR PLEASURE IN DOING THINGS: NOT AT ALL
8. MOVING OR SPEAKING SO SLOWLY THAT OTHER PEOPLE COULD HAVE NOTICED. OR THE OPPOSITE, BEING SO FIGETY OR RESTLESS THAT YOU HAVE BEEN MOVING AROUND A LOT MORE THAN USUAL: NEARLY EVERY DAY
4. FEELING TIRED OR HAVING LITTLE ENERGY: NEARLY EVERY DAY

## 2024-07-10 ASSESSMENT — COLUMBIA-SUICIDE SEVERITY RATING SCALE - C-SSRS
4. HAVE YOU HAD THESE THOUGHTS AND HAD SOME INTENTION OF ACTING ON THEM?: YES
6. HAVE YOU EVER DONE ANYTHING, STARTED TO DO ANYTHING, OR PREPARED TO DO ANYTHING TO END YOUR LIFE?: YES
1. IN THE PAST MONTH, HAVE YOU WISHED YOU WERE DEAD OR WISHED YOU COULD GO TO SLEEP AND NOT WAKE UP?: YES
6. HAVE YOU EVER DONE ANYTHING, STARTED TO DO ANYTHING, OR PREPARED TO DO ANYTHING TO END YOUR LIFE?: YES
2. HAVE YOU ACTUALLY HAD ANY THOUGHTS OF KILLING YOURSELF?: YES
5. HAVE YOU STARTED TO WORK OUT OR WORKED OUT THE DETAILS OF HOW TO KILL YOURSELF? DO YOU INTEND TO CARRY OUT THIS PLAN?: YES

## 2024-07-10 ASSESSMENT — PAIN SCALES - GENERAL: PAINLEVEL: 0-NO PAIN

## 2024-07-10 NOTE — PROGRESS NOTES
[facility]   Thoracic Oncology Follow-up Visit - [place]    Cancer History:          Thorax - Lung Cancer Non Small Cell         AJCC Edition: 7th, Diagnosis Date: 15-Oct-2013, Stage IIIA, T3 N1 M0      Treatment Synopsis:    DIAGNOSIS  Squamous Cell lung cancer (2013)     STAGING  Stage IIIA (2013)     CURRENT SITES OF DISEASE  none; does have HECTOR spiculated lesion stable s/p SRS in 2015     MOLECULAR GENOMICS - [facility] report 11/17/2014 with the following  13 genomic alterations Genomic Alterations Identified   AKT2 amplification   KDR amplification   KIT amplification   PDGFRA amplification   PIK3CA amplification   CDKN2A  [id number] S12*   PRKCI amplification   TP53 V157F   FAT1 G733fs*4   MLL2 *   SMARCA4 splice site 4911+1G>C   SOX2 amplification   TERC amplification         SERUM TUMOR MARKER n/a     PREVIOUS THERAPY:   1. Right pneumonectomy and mediastinal lymph node dissection on November 15, 2013. The surgical path revealed a 7 cm mass with negative surgical margins, and 1 out of 9 peribronchial and hilar lymph nodes were positive, and all other mediastinal lymph  nodes were negative.   2. Adjuvant chemotherapy January 6, 2014, to March 24, 2014-cisplatin 75 mg/sq m on day 1 and gemcitabine 1250 mg/sq m (changed to 1000 mg/sq m from cycle 2) on day 1 and 8  every 21 days, status post 4 cycles from . Neulasta support on day 9 started from cycle 2.      3. SRS for LEFT UPPER Lobe lesion May - June 2015     CURRENT THERAPY  surveillance     CURRENT ONCOLOGICAL PROBLEMS  spiculated HECTOR nodule - stable on scans for several years     HISTORY OF PRESENT ILLNESS  Mr. Hair was diagnosed with stage IIIA squamous cell cancer of R lung in 2013 at age 53,  he had R pneumonectomy in November 2013 and received 4 cycles adjuvant chemotherapy completed in March 2014; thereafter has been followed for surveillance. In  5/2015 he was found to have PET-avid 1 cm lung nodule HECTOR,  "felt to be unsafe for biopsy. He was treated with SRS completed 6/2015.  Since then, the HECTOR nodule has been stable on successive scans.  He also follows with pulmonary - has been prescribed oxygen 2L/min to use with activity but he does not use it.  First visit with Dr. David on 7/12/2023           History of Present Illness:      ID Statement:    FERNANDO MENDEZ is a 62 year old Male        Chief Complaint: \"Can I get a new handicap placard\"   Interval History:    Presents by himself for follow-up visit to review surveillance scans.      Reports he is still smoking a half pack per day and does not have plans to quit smoking. He reports no change to his stamina or activities. He has been prescribed oxygen but does not use it.     He recently had an in-ground pool put in and has been doing a lot of yard maintenance around the pool.     Regarding pain, he describes chronic musculoskeletal pain - \"I worked 30 years as a , I have pain in lots of places.\" He does not have any new sites of pain, he does not modify his usual activities due to pain.     Review of Systems:   Review of Systems:    Pertinent Positive: none see HPI for details.     Pertinent Negatives:  nausea  vomiting  diarrhea  headache,   vision change, hearing change, tinnitus  dyspnea, orthopnea, chest pain,    difficulty swallowing  abdominal pain  frequent urination/incomplete voiding  bowel/bladder incontinence  numbness/tingling in fingers or toes  tremors, unsteady gait  fevers, chills, sick contacts.     A 13 point ROS was reviewed and negative unless noted above.          Allergies and Intolerances:       Allergies:         No Known Allergies: Active     Outpatient Medication Profile:  * Patient Currently Takes Medications as of 15-Jul-2023 10:36 documented in Structured Notes         Portable Oxygen : Last Dose Taken:  , 2L via nasal cannula with exertion, As Needed , Start Date: 09-Jan-2023        Additional Comments: I reviewed all " "medications on  the date of service.  Patient has oxygen prescribed but does not use it.              Medical History:         Anxiety: ICD-10: F41.1, Status: Active         Chronic obstructive pulmonary disease: ICD-10: J44.9, Status:  Active         Lung nodule: ICD-10: R91.1, Status: Active         Malignant neoplasm of right lung: ICD-10: C34.91, Status: Active         Hepatic vein thrombosis: ICD-10: I82.0, Status: Active       Surg History:         Status post pneumonectomy: ICD-10: Z90.2, Status: Active        Social History:   Social Substance History:  ·  Smoking Status current every day smoker    ·  Is the patient interested in referral for cessation counseling? no    ·  Tobacco Use half pack per day for 10 years, used to smoke 3 ppd    ·  Drug Use prior polysubstance use in 2022    ·  Additional History     Social history:  No history of blood transfusions or hepatitis.   Employment-  former brick layer, retired from construction business.    Family History:  No history of hematologic disorders  noted. Sister- lung cancer.           Performance:   ECOG Performance Status: 2- Ambulatory 50% of waking  hours         Vitals and Measurements:   Visit Vitals  /71 (BP Location: Left arm, Patient Position: Sitting, BP Cuff Size: Adult long)   Pulse 93   Temp 37 °C (98.6 °F) (Temporal)   Resp 16   Ht (S) 1.702 m (5' 7.01\")   Wt 55 kg (121 lb 5.8 oz)   SpO2 94%   BMI 19.00 kg/m²   Smoking Status Every Day   BSA 1.61 m²     Physical Exam:   Constitutional: Thin man, appears stated age, well-groomed  in street clothes,  awake/alert/oriented x3, no distress, alert and cooperative  Long hair.   Eyes: PERRL, EOMI, clear sclera, eyebrows present.   ENMT: Symmetric facial expressions  Oral mucous membranes moist  no palpable pre/post-auricular LAD   Head/Neck: No palpable cervical chain LN   Respiratory/Thorax: unlabored breathing on room air,  good chest expansion, good air movement in all lung fields on " auscultation   Cardiovascular: Regular rate and rhythm, normal S1  and S2, radial pulses symmetric   Gastrointestinal: Nondistended, soft, non-tender  abdomen   Musculoskeletal: Normal muscle bulk and tone, ROM  intact, no joint swelling.  Rises from chair and walks unassisted.   Extremities: No ankle swelling, no arm or leg wounds,  no digital clubbing.   Neurological: Alert, cognition intact, speech normal.  Facial expressions symmetric.  No motor deficits noted. Sensation intact to touch and hot/cold.   Able to stand from seated position unassisted and walks around the room unassisted. Involuntary movements of both legs, chronic per patient.   Lymphatic: No palpable clavicular, axillary LAD   Psychological: Appropriate mood and behavior.   Skin: Warm and dry, no lesions, no rashes         Lab Results:   no new labs for this visit     Radiology Result:  7/1/24 CT Chest non contrast  -stable left upper lobe spiculated lesion 10mm x 13mm  -stable post surgical changes from R pneumonectomy     7/3/2023 CT Chest non-contrast  -heart deviated toward right chest due to lobectomy. Stable spiculated lesion in HECTOR, no change in size compared to prior scans.       Pathology Results:     ·  Results     Prior Pathology reports reviewed:     Surgical Pathology  Specimens: [id number] (10/15/13)     Accession #: [id number]   Pathologist: PATRICE FLOWERS MD   Date of Procedure: 10/21/2013   Date Received: 10/21/2013   Submitting Physician: ISA JEFFERSON MD, PhD   Location: [facility]      FINAL DIAGNOSIS   A. [id number] (10/15/13):     RIGHT MAINSTEM BRONCHUS, BIOPSY:   --INVASIVE POORLY DIFFERENTIATED SQUAMOUS CELL CARCINOMA. SEE NOTE.     Note: By immunostains performed at the outside hospital, the tumor cells are    positive for p63 and CK5/6, focally positive for CK7, and negative for TTF-1,   CK20, Napsin A and CDX-2, consistent with the above diagnosis.     B. [id number] (10/15/13):     1. RIGHT BRONCHIAL WASHING:   --NO MALIGNANT CELLS  "IDENTIFIED.      2. BRONCHIAL BRUSHING:   --MALIGNANT CELLS DERIVED FROM A NON-SMALL CELL CARCINOMA.     [facility]     Surgical Pathology [Nov 17 2014 11:01AM]    Specimens: FERNANDO Ramirez     Accession #: [id number]   Pathologist: PATRICE FLOWERS MD   Date of Procedure: 11/15/2013   Date Received: 11/15/2013   Date Reported 11/20/2013   Submitting Physician: APPLE JAQUEZ M.D.   Location: [facility]     FINAL DIAGNOSIS   A. SPECIMEN DESIGNATED \"4R\":   --LYMPH NODE FRAGMENTS WITH NO MALIGNANCY IDENTIFIED.     B. SPECIMEN DESIGNATED \"4R\":   --LYMPH NODE FRAGMENTS WITH NO MALIGNANCY IDENTIFIED.      C. 10 R. LYMPH NODE:   --LYMPH NODE FRAGMENTS WITH NO MALIGNANCY IDENTIFIED.     D. 3 P LYMPH NODE:   --LYMPH NODE FRAGMENTS WITH NO MALIGNANCY IDENTIFIED.     E. LEVEL 7 LYMPH NODE:   --LYMPH NODE FRAGMENTS WITH NO MALIGNANCY  IDENTIFIED.     F. 4R LYMPH NODE:   --LYMPH NODE FRAGMENTS WITH NO MALIGNANCY IDENTIFIED.     G. RIGHT LUNG, PNEUMONECTOMY:   --INVASIVE POORLY DIFFERENTIATED SQUAMOUS CELL CARCINOMA.   --METASTATIC SQUAMOUS CELL CARCINOMA INVOLVING  1 OF 9 HILAR/PERIBRONCHIAL LYMPH   NODES.   --HEMORRHAGIC INFARCTS (2), RIGHT LOWER LOBE.     : Dr. José Miguel Piña (selected slides from part G)     [facility]       CANCER SUMMARY REPORT     G. RIGHT LUNG:    Specimen: Lung     Procedure: Pneumonectomy Part G   Specimen Laterality: Right   Tumor Site: Hilum of upper, middle, and lower lobes;   Histologic Type: Squamous cell carcinoma   Histologic Grade: Poorly differentiated   Tumor  Size: Greatest dimension: 7.0 cm   Visceral Pleura Invasion: Indeterminate   Tumor Extension: Into hilar adipose tissue; less than 1 mm   from inked margin   Bronchial Margin: Uninvolved by invasive carcinoma and   carcinoma in situ    Vascular Margin: Uninvolved by invasive carcinoma   Staple Line Margin: Not applicable   Parietal Pleural Margin: Not applicable   Chest Wall Margin: Not applicable   Treatment Effect: Cannot be " determined   Lymph-Vascular Invasion:  Present   Regional Lymph Nodes: Peribronchial/Hilar   Total Involved 1   Total Examined 9   Additional Pathologic Findings: Emphysema   Mucous plugging of bronchioles       Electronically Signed Out By PATRICE FLOWERS MD/SANDI      Intraoperative Consultation:   Microscopic diagnosis:   FS A1: No evidence of malignancy   FSB 1, FSB 2: No evidence of malignancy   MD Micki         Addendum/Procedures:   Special Oncology Report With Image Date Ordered:  11/11/2014 Status:   Signed Out   Date Complete: 11/11/2014   Date Reported: 11/17/2014     Addendum Diagnosis   A complete [facility] test result issued by [employer]([place], [place])is on file in the Department  of Anatomic Pathology at   [facility].     TUMOR TYPE: LUNG SQUAMOUS CELL   CARCINOMA ([facility]CC)   13 genomic alterations Genomic Alterations Identified   AKT2 amplification   KDR amplification    KIT amplification   PDGFRA amplification   PIK3CA amplification   CDKN2A [id number] S12*   PRKCI amplification   TP53 V157F   FAT1 G733fs*4   MLL2 *   SMARCA4 splice site 4911+1G>C   SOX2 amplification   TERC  amplification     Additional Disease-relevant Genes with No Reportable   Alterations Identified   KRAS   EGFR     Electronically Signed Out By PATRICE FLOWERS MD/SANDI   By the siignature on this report, the individual or group listed as making the   Final Interpretation/Diagnosis certifies that they have  reviewed this case.      Assessment and Plan:   Assessment:    62yoM with prior squamous cell lung cancer in 2013 s/p lobectomy and adjuvant chemotherapy. He has been on surveillance for a HECTOR spiculated lung nodule which was treated with SRS in 5/2015 and has remained stable. Other co-morbidities include weakness as sequela of Guillain-Seguin Syndrome years ago.   Overall he appears to be stable in terms of symptoms and imaging remains stable without evidence of disease  recurrence.      Squamous Cell Lung Cancer s/p R lobectomy   Surveillance of Left Upper Lobe spiculated nodule.  -continue with yearly lung screening for 2nd primary cancers and surveillance of HECTOR nodule  -follow-up in July 2024 with non-contrast CT Chest     Smoking Cessation Advised  -he continues smoking, but only a few per day.         Time spent on this visit: 20 min     Billie David MD  Medical Oncology  [facility]

## 2024-07-10 NOTE — PROGRESS NOTES
Patient here today for follow up and for review of recent imaging. Last office visit was July 2023. SOB worsening.     He states he has no energy and is frustrated because he can't do the things he wants to do (yard work, take care of his house, etc).    Medications reviewed with patient. He states he threw everything out.     Disability placard signed by Dr. David and given to patient.   Follow up in 1 year with CT chest.

## 2024-08-10 ENCOUNTER — HOSPITAL ENCOUNTER (EMERGENCY)
Facility: HOSPITAL | Age: 64
Discharge: HOME | End: 2024-08-11
Payer: COMMERCIAL

## 2024-08-10 VITALS
BODY MASS INDEX: 18.05 KG/M2 | WEIGHT: 115 LBS | OXYGEN SATURATION: 95 % | DIASTOLIC BLOOD PRESSURE: 79 MMHG | RESPIRATION RATE: 20 BRPM | HEIGHT: 67 IN | TEMPERATURE: 97.5 F | SYSTOLIC BLOOD PRESSURE: 164 MMHG | HEART RATE: 90 BPM

## 2024-08-10 PROCEDURE — 4500999001 HC ED NO CHARGE

## 2024-08-10 ASSESSMENT — PAIN - FUNCTIONAL ASSESSMENT: PAIN_FUNCTIONAL_ASSESSMENT: 0-10

## 2024-08-10 ASSESSMENT — COLUMBIA-SUICIDE SEVERITY RATING SCALE - C-SSRS
6. HAVE YOU EVER DONE ANYTHING, STARTED TO DO ANYTHING, OR PREPARED TO DO ANYTHING TO END YOUR LIFE?: NO
1. IN THE PAST MONTH, HAVE YOU WISHED YOU WERE DEAD OR WISHED YOU COULD GO TO SLEEP AND NOT WAKE UP?: NO
2. HAVE YOU ACTUALLY HAD ANY THOUGHTS OF KILLING YOURSELF?: NO

## 2024-08-10 ASSESSMENT — PAIN SCALES - GENERAL: PAINLEVEL_OUTOF10: 10 - WORST POSSIBLE PAIN

## 2024-08-10 ASSESSMENT — PAIN DESCRIPTION - PAIN TYPE: TYPE: ACUTE PAIN

## 2024-08-10 ASSESSMENT — PAIN DESCRIPTION - LOCATION: LOCATION: OTHER (COMMENT)

## 2024-08-19 ENCOUNTER — LAB (OUTPATIENT)
Dept: LAB | Facility: LAB | Age: 64
End: 2024-08-19
Payer: COMMERCIAL

## 2024-08-19 DIAGNOSIS — R69 DIAGNOSIS UNKNOWN: Primary | ICD-10-CM

## 2024-08-19 LAB
ALBUMIN SERPL BCP-MCNC: 4.6 G/DL (ref 3.4–5)
ALP SERPL-CCNC: 93 U/L (ref 33–136)
ALT SERPL W P-5'-P-CCNC: 19 U/L (ref 10–52)
ANION GAP SERPL CALC-SCNC: 15 MMOL/L (ref 10–20)
AST SERPL W P-5'-P-CCNC: 22 U/L (ref 9–39)
BASOPHILS # BLD AUTO: 0.08 X10*3/UL (ref 0–0.1)
BASOPHILS NFR BLD AUTO: 1 %
BILIRUB SERPL-MCNC: 0.6 MG/DL (ref 0–1.2)
BUN SERPL-MCNC: 18 MG/DL (ref 6–23)
CALCIUM SERPL-MCNC: 10 MG/DL (ref 8.6–10.6)
CHLORIDE SERPL-SCNC: 104 MMOL/L (ref 98–107)
CHOLEST SERPL-MCNC: 219 MG/DL (ref 0–199)
CHOLESTEROL/HDL RATIO: 3.3
CO2 SERPL-SCNC: 27 MMOL/L (ref 21–32)
CREAT SERPL-MCNC: 1.01 MG/DL (ref 0.5–1.3)
EGFRCR SERPLBLD CKD-EPI 2021: 83 ML/MIN/1.73M*2
EOSINOPHIL # BLD AUTO: 0.23 X10*3/UL (ref 0–0.7)
EOSINOPHIL NFR BLD AUTO: 2.8 %
ERYTHROCYTE [DISTWIDTH] IN BLOOD BY AUTOMATED COUNT: 13.7 % (ref 11.5–14.5)
GLUCOSE SERPL-MCNC: 84 MG/DL (ref 74–99)
HCT VFR BLD AUTO: 46.2 % (ref 41–52)
HDLC SERPL-MCNC: 65.5 MG/DL
HGB BLD-MCNC: 14.8 G/DL (ref 13.5–17.5)
IMM GRANULOCYTES # BLD AUTO: 0.02 X10*3/UL (ref 0–0.7)
IMM GRANULOCYTES NFR BLD AUTO: 0.2 % (ref 0–0.9)
LDLC SERPL CALC-MCNC: 136 MG/DL
LYMPHOCYTES # BLD AUTO: 1.8 X10*3/UL (ref 1.2–4.8)
LYMPHOCYTES NFR BLD AUTO: 21.7 %
MCH RBC QN AUTO: 29.2 PG (ref 26–34)
MCHC RBC AUTO-ENTMCNC: 32 G/DL (ref 32–36)
MCV RBC AUTO: 91 FL (ref 80–100)
MONOCYTES # BLD AUTO: 0.55 X10*3/UL (ref 0.1–1)
MONOCYTES NFR BLD AUTO: 6.6 %
NEUTROPHILS # BLD AUTO: 5.6 X10*3/UL (ref 1.2–7.7)
NEUTROPHILS NFR BLD AUTO: 67.7 %
NON HDL CHOLESTEROL: 154 MG/DL (ref 0–149)
NRBC BLD-RTO: 0 /100 WBCS (ref 0–0)
PLATELET # BLD AUTO: 235 X10*3/UL (ref 150–450)
POTASSIUM SERPL-SCNC: 5.4 MMOL/L (ref 3.5–5.3)
PROT SERPL-MCNC: 7.9 G/DL (ref 6.4–8.2)
RBC # BLD AUTO: 5.07 X10*6/UL (ref 4.5–5.9)
SODIUM SERPL-SCNC: 141 MMOL/L (ref 136–145)
TRIGL SERPL-MCNC: 90 MG/DL (ref 0–149)
VLDL: 18 MG/DL (ref 0–40)
WBC # BLD AUTO: 8.3 X10*3/UL (ref 4.4–11.3)

## 2024-08-19 PROCEDURE — 85025 COMPLETE CBC W/AUTO DIFF WBC: CPT | Mod: WAIVER OF LIABILITY ON FILE

## 2024-08-19 PROCEDURE — 80053 COMPREHEN METABOLIC PANEL: CPT

## 2024-08-19 PROCEDURE — 36415 COLL VENOUS BLD VENIPUNCTURE: CPT

## 2024-08-19 PROCEDURE — 84443 ASSAY THYROID STIM HORMONE: CPT | Mod: WAIVER OF LIABILITY ON FILE

## 2024-08-19 PROCEDURE — 80061 LIPID PANEL: CPT | Mod: WAIVER OF LIABILITY ON FILE

## 2024-08-20 LAB — TSH SERPL-ACNC: 1.88 MIU/L (ref 0.44–3.98)

## 2024-09-24 ENCOUNTER — OFFICE VISIT (OUTPATIENT)
Dept: PRIMARY CARE | Facility: CLINIC | Age: 64
End: 2024-09-24
Payer: COMMERCIAL

## 2024-09-24 VITALS
WEIGHT: 118 LBS | DIASTOLIC BLOOD PRESSURE: 70 MMHG | BODY MASS INDEX: 18.52 KG/M2 | OXYGEN SATURATION: 94 % | HEART RATE: 64 BPM | HEIGHT: 67 IN | RESPIRATION RATE: 20 BRPM | SYSTOLIC BLOOD PRESSURE: 102 MMHG | TEMPERATURE: 97.3 F

## 2024-09-24 DIAGNOSIS — T78.40XD ALLERGY, SUBSEQUENT ENCOUNTER: ICD-10-CM

## 2024-09-24 DIAGNOSIS — Z13.6 SCREENING FOR AAA (ABDOMINAL AORTIC ANEURYSM): ICD-10-CM

## 2024-09-24 DIAGNOSIS — G89.4 CHRONIC PAIN SYNDROME: ICD-10-CM

## 2024-09-24 DIAGNOSIS — J44.9 CHRONIC OBSTRUCTIVE PULMONARY DISEASE, UNSPECIFIED COPD TYPE (MULTI): ICD-10-CM

## 2024-09-24 DIAGNOSIS — F17.200 TOBACCO DEPENDENCY: Primary | ICD-10-CM

## 2024-09-24 DIAGNOSIS — E87.5 SERUM POTASSIUM ELEVATED: ICD-10-CM

## 2024-09-24 LAB — POTASSIUM SERPL-SCNC: 4.5 MMOL/L (ref 3.5–5.3)

## 2024-09-24 PROCEDURE — 1159F MED LIST DOCD IN RCRD: CPT | Performed by: NURSE PRACTITIONER

## 2024-09-24 PROCEDURE — 36415 COLL VENOUS BLD VENIPUNCTURE: CPT | Performed by: NURSE PRACTITIONER

## 2024-09-24 PROCEDURE — 1123F ACP DISCUSS/DSCN MKR DOCD: CPT | Performed by: NURSE PRACTITIONER

## 2024-09-24 PROCEDURE — 1158F ADVNC CARE PLAN TLK DOCD: CPT | Performed by: NURSE PRACTITIONER

## 2024-09-24 PROCEDURE — 99204 OFFICE O/P NEW MOD 45 MIN: CPT | Performed by: NURSE PRACTITIONER

## 2024-09-24 PROCEDURE — 3008F BODY MASS INDEX DOCD: CPT | Performed by: NURSE PRACTITIONER

## 2024-09-24 PROCEDURE — 1125F AMNT PAIN NOTED PAIN PRSNT: CPT | Performed by: NURSE PRACTITIONER

## 2024-09-24 PROCEDURE — 84132 ASSAY OF SERUM POTASSIUM: CPT | Performed by: NURSE PRACTITIONER

## 2024-09-24 RX ORDER — FLUTICASONE PROPIONATE 50 MCG
1 SPRAY, SUSPENSION (ML) NASAL DAILY
Qty: 16 G | Refills: 5 | Status: SHIPPED | OUTPATIENT
Start: 2024-09-24 | End: 2025-09-24

## 2024-09-24 RX ORDER — LORATADINE 10 MG/1
10 TABLET ORAL DAILY
Qty: 30 TABLET | Refills: 2 | Status: SHIPPED | OUTPATIENT
Start: 2024-09-24 | End: 2024-12-23

## 2024-09-24 ASSESSMENT — PATIENT HEALTH QUESTIONNAIRE - PHQ9
8. MOVING OR SPEAKING SO SLOWLY THAT OTHER PEOPLE COULD HAVE NOTICED. OR THE OPPOSITE, BEING SO FIGETY OR RESTLESS THAT YOU HAVE BEEN MOVING AROUND A LOT MORE THAN USUAL: NEARLY EVERY DAY
SUM OF ALL RESPONSES TO PHQ9 QUESTIONS 1 AND 2: 6
4. FEELING TIRED OR HAVING LITTLE ENERGY: NEARLY EVERY DAY
2. FEELING DOWN, DEPRESSED OR HOPELESS: NEARLY EVERY DAY
SUM OF ALL RESPONSES TO PHQ QUESTIONS 1-9: 25
7. TROUBLE CONCENTRATING ON THINGS, SUCH AS READING THE NEWSPAPER OR WATCHING TELEVISION: NEARLY EVERY DAY
1. LITTLE INTEREST OR PLEASURE IN DOING THINGS: NEARLY EVERY DAY
3. TROUBLE FALLING OR STAYING ASLEEP OR SLEEPING TOO MUCH: NEARLY EVERY DAY
9. THOUGHTS THAT YOU WOULD BE BETTER OFF DEAD, OR OF HURTING YOURSELF: SEVERAL DAYS
6. FEELING BAD ABOUT YOURSELF - OR THAT YOU ARE A FAILURE OR HAVE LET YOURSELF OR YOUR FAMILY DOWN: NEARLY EVERY DAY
5. POOR APPETITE OR OVEREATING: NEARLY EVERY DAY

## 2024-09-24 ASSESSMENT — LIFESTYLE VARIABLES
AUDIT-C TOTAL SCORE: 0
HOW OFTEN DO YOU HAVE SIX OR MORE DRINKS ON ONE OCCASION: NEVER
SKIP TO QUESTIONS 9-10: 1
HOW OFTEN DO YOU HAVE A DRINK CONTAINING ALCOHOL: NEVER
HOW MANY STANDARD DRINKS CONTAINING ALCOHOL DO YOU HAVE ON A TYPICAL DAY: PATIENT DOES NOT DRINK

## 2024-09-24 ASSESSMENT — PAIN SCALES - GENERAL: PAINLEVEL: 10-WORST PAIN EVER

## 2024-09-24 ASSESSMENT — ENCOUNTER SYMPTOMS
UNEXPECTED WEIGHT CHANGE: 1
FATIGUE: 1

## 2024-09-24 NOTE — PROGRESS NOTES
"Subjective   Patient ID: Joseph Hair is a 63 y.o. male who presents for MEDICATIONS (PT C/O TROUBLE BREATHING, RUNNY NOSE, BAD TASTE IN MOUTH, FATIGUE X 1 WK. ) and Abdominal Pain.    HERE TOGETESTABLISHED,BROTHER COME HERE, HAS HAD TROUBLE BREATHING HAS BEEN REALLY FATIGUED, NEEDS TO GET BACK ON ADDERALL, HAD OX GEN USED TO SEE DR POLY DUEÑAS PT STATES NO ABDOMINAL PAIN WAS ON ADDERALL WANTS IT REFILLED, reviewed  chart and labs         Review of Systems   Constitutional:  Positive for fatigue and unexpected weight change.       Objective   /70   Pulse 64   Temp 36.3 °C (97.3 °F)   Resp 20   Ht 1.702 m (5' 7\")   Wt 53.5 kg (118 lb)   SpO2 94%   BMI 18.48 kg/m²     Physical Exam  HENT:      Right Ear: Tympanic membrane normal.   Eyes:      Conjunctiva/sclera: Conjunctivae normal.   Cardiovascular:      Rate and Rhythm: Normal rate and regular rhythm.   Pulmonary:      Effort: Pulmonary effort is normal.      Breath sounds: Normal breath sounds.   Abdominal:      General: Bowel sounds are normal.   Psychiatric:      Comments: PT WANTS TO GET RESTARTED ON ADDERALL,       Assessment/Plan   Problem List Items Addressed This Visit             ICD-10-CM    COPD (chronic obstructive pulmonary disease) (Multi) J44.9    Relevant Orders    Referral to Pulmonology     Other Visit Diagnoses         Codes    Tobacco dependency    -  Primary F17.200    Relevant Orders    Vascular US Abdominal Aorta Aneurysm AAA Screening    Serum potassium elevated     E87.5    Relevant Orders    Potassium (Completed)    Allergy, subsequent encounter     T78.40XD    Relevant Medications    fluticasone (Flonase) 50 mcg/actuation nasal spray    loratadine (Claritin) 10 mg tablet    Chronic pain syndrome     G89.4    Relevant Orders    Referral to Pain Medicine    Screening for AAA (abdominal aortic aneurysm)     Z13.6        Welcome to practice,  discussed maney  issues, discussed seeing psych , should discuss adder al with him, " reviewed labs  will call with  results  practice explained, discussed smoking censatuion, not ready yet,

## 2024-10-28 ENCOUNTER — OFFICE VISIT (OUTPATIENT)
Dept: PAIN MEDICINE | Facility: CLINIC | Age: 64
End: 2024-10-28
Payer: MEDICARE

## 2024-10-28 VITALS
OXYGEN SATURATION: 97 % | HEART RATE: 96 BPM | SYSTOLIC BLOOD PRESSURE: 107 MMHG | RESPIRATION RATE: 22 BRPM | DIASTOLIC BLOOD PRESSURE: 48 MMHG | HEIGHT: 68 IN | BODY MASS INDEX: 17.88 KG/M2 | WEIGHT: 118 LBS

## 2024-10-28 DIAGNOSIS — G89.4 CHRONIC PAIN SYNDROME: ICD-10-CM

## 2024-10-28 DIAGNOSIS — M79.2 NEURALGIA: Primary | ICD-10-CM

## 2024-10-28 PROCEDURE — 1159F MED LIST DOCD IN RCRD: CPT | Performed by: ANESTHESIOLOGY

## 2024-10-28 PROCEDURE — 3008F BODY MASS INDEX DOCD: CPT | Performed by: ANESTHESIOLOGY

## 2024-10-28 PROCEDURE — 99214 OFFICE O/P EST MOD 30 MIN: CPT | Performed by: ANESTHESIOLOGY

## 2024-10-28 PROCEDURE — 1125F AMNT PAIN NOTED PAIN PRSNT: CPT | Performed by: ANESTHESIOLOGY

## 2024-10-28 PROCEDURE — G2211 COMPLEX E/M VISIT ADD ON: HCPCS | Performed by: ANESTHESIOLOGY

## 2024-10-28 PROCEDURE — 99204 OFFICE O/P NEW MOD 45 MIN: CPT | Performed by: ANESTHESIOLOGY

## 2024-10-28 PROCEDURE — 1123F ACP DISCUSS/DSCN MKR DOCD: CPT | Performed by: ANESTHESIOLOGY

## 2024-10-28 RX ORDER — DULOXETIN HYDROCHLORIDE 30 MG/1
30 CAPSULE, DELAYED RELEASE ORAL DAILY
Qty: 30 CAPSULE | Refills: 2 | Status: SHIPPED | OUTPATIENT
Start: 2024-10-28 | End: 2025-10-28

## 2024-10-28 ASSESSMENT — ENCOUNTER SYMPTOMS
ARTHRALGIAS: 1
ACTIVITY CHANGE: 1
BACK PAIN: 1

## 2024-10-28 ASSESSMENT — PAIN SCALES - GENERAL
PAINLEVEL_OUTOF10: 9
PAINLEVEL_OUTOF10: 9

## 2024-10-28 ASSESSMENT — PAIN DESCRIPTION - DESCRIPTORS: DESCRIPTORS: DISCOMFORT

## 2024-10-28 ASSESSMENT — PAIN - FUNCTIONAL ASSESSMENT: PAIN_FUNCTIONAL_ASSESSMENT: 0-10

## 2025-01-27 ENCOUNTER — OFFICE VISIT (OUTPATIENT)
Dept: PAIN MEDICINE | Facility: CLINIC | Age: 65
End: 2025-01-27
Payer: MEDICARE

## 2025-01-27 VITALS
DIASTOLIC BLOOD PRESSURE: 70 MMHG | RESPIRATION RATE: 22 BRPM | WEIGHT: 118 LBS | OXYGEN SATURATION: 99 % | HEIGHT: 68 IN | SYSTOLIC BLOOD PRESSURE: 90 MMHG | BODY MASS INDEX: 17.88 KG/M2 | HEART RATE: 66 BPM

## 2025-01-27 DIAGNOSIS — G62.9 NEUROPATHY: Primary | ICD-10-CM

## 2025-01-27 PROCEDURE — 1159F MED LIST DOCD IN RCRD: CPT | Performed by: ANESTHESIOLOGY

## 2025-01-27 PROCEDURE — 99214 OFFICE O/P EST MOD 30 MIN: CPT | Performed by: ANESTHESIOLOGY

## 2025-01-27 PROCEDURE — 3008F BODY MASS INDEX DOCD: CPT | Performed by: ANESTHESIOLOGY

## 2025-01-27 PROCEDURE — 1125F AMNT PAIN NOTED PAIN PRSNT: CPT | Performed by: ANESTHESIOLOGY

## 2025-01-27 PROCEDURE — G2211 COMPLEX E/M VISIT ADD ON: HCPCS | Performed by: ANESTHESIOLOGY

## 2025-01-27 PROCEDURE — 1123F ACP DISCUSS/DSCN MKR DOCD: CPT | Performed by: ANESTHESIOLOGY

## 2025-01-27 ASSESSMENT — ENCOUNTER SYMPTOMS
NUMBNESS: 1
WEAKNESS: 1

## 2025-01-27 ASSESSMENT — PATIENT HEALTH QUESTIONNAIRE - PHQ9
10. IF YOU CHECKED OFF ANY PROBLEMS, HOW DIFFICULT HAVE THESE PROBLEMS MADE IT FOR YOU TO DO YOUR WORK, TAKE CARE OF THINGS AT HOME, OR GET ALONG WITH OTHER PEOPLE: SOMEWHAT DIFFICULT
2. FEELING DOWN, DEPRESSED OR HOPELESS: SEVERAL DAYS
1. LITTLE INTEREST OR PLEASURE IN DOING THINGS: SEVERAL DAYS
SUM OF ALL RESPONSES TO PHQ9 QUESTIONS 1 & 2: 2

## 2025-01-27 ASSESSMENT — LIFESTYLE VARIABLES
AUDIT-C TOTAL SCORE: 7
SKIP TO QUESTIONS 9-10: 0
HOW OFTEN DO YOU HAVE SIX OR MORE DRINKS ON ONE OCCASION: WEEKLY
HOW MANY STANDARD DRINKS CONTAINING ALCOHOL DO YOU HAVE ON A TYPICAL DAY: 3 OR 4
HOW OFTEN DO YOU HAVE A DRINK CONTAINING ALCOHOL: 2-3 TIMES A WEEK

## 2025-01-27 ASSESSMENT — PAIN DESCRIPTION - DESCRIPTORS: DESCRIPTORS: ACHING;DISCOMFORT;DULL

## 2025-01-27 ASSESSMENT — PAIN SCALES - GENERAL
PAINLEVEL_OUTOF10: 9
PAINLEVEL_OUTOF10: 9

## 2025-01-27 ASSESSMENT — PAIN - FUNCTIONAL ASSESSMENT: PAIN_FUNCTIONAL_ASSESSMENT: 0-10

## 2025-01-27 NOTE — PROGRESS NOTES
The patient is a 64-year-old male with bilateral lower extremity pain.  He attributes his pain to having Guillain-Barré many years ago.  He describes sharp and burning pain in his feet.  The pain is constant.  He and I discussed Calmare when we met in October.  The patient is still interested in this therapy.    Review of Systems   Musculoskeletal:  Positive for gait problem.   Neurological:  Positive for weakness and numbness.   All other systems reviewed and are negative.    GENERAL: alert and appropriate, in no distress, very thin body habitus  SKIN: no rash noted  RESPIRATORY: breathing non-labored and no grunting/flaring/retractions  CHEST: equal chest rise with normal respiratory effort  ABDOMEN: soft and non-tender  BACK: back normal in appearance, spine with reduced ROM  EXTREMITIES: strength intact  NEUROLOGIC: gait antalgic, SLR negative, sensation grossly intact, tremors and tics    Assessment and Plan    -Chronicity--chronic neuropathic pain    -Diagnostics--no new imaging ordered    -Pharmacologic--no change    -Psychologic--no need for psychologic intervention from my standpoint.  There are no mental health issues of which I am aware that are contributing to the patient's pain.  There are no substance abuse or alcohol abuse issues of which I am aware that are contributing to the patient's pain.    -Physical--we discussed the importance of physical therapy and exercise.  We discussed avoidance and modification techniques.    -Intervention--the patient is a candidate for Calmare therapy    I spent time educating the patient on the condition including the treatment and the prognosis.  I invited the patient to call at anytime with any questions.

## 2025-02-23 ENCOUNTER — OFFICE VISIT (OUTPATIENT)
Dept: URGENT CARE | Age: 65
End: 2025-02-23
Payer: MEDICARE

## 2025-02-23 ENCOUNTER — HOSPITAL ENCOUNTER (EMERGENCY)
Facility: HOSPITAL | Age: 65
Discharge: HOME | End: 2025-02-23
Attending: EMERGENCY MEDICINE
Payer: MEDICARE

## 2025-02-23 VITALS
OXYGEN SATURATION: 99 % | HEIGHT: 68 IN | SYSTOLIC BLOOD PRESSURE: 108 MMHG | WEIGHT: 125 LBS | TEMPERATURE: 97.6 F | HEART RATE: 93 BPM | RESPIRATION RATE: 18 BRPM | DIASTOLIC BLOOD PRESSURE: 75 MMHG | BODY MASS INDEX: 18.94 KG/M2

## 2025-02-23 VITALS
RESPIRATION RATE: 18 BRPM | SYSTOLIC BLOOD PRESSURE: 115 MMHG | HEART RATE: 95 BPM | TEMPERATURE: 97.9 F | DIASTOLIC BLOOD PRESSURE: 82 MMHG | OXYGEN SATURATION: 98 %

## 2025-02-23 DIAGNOSIS — H53.8 BLURRY VISION, RIGHT EYE: Primary | ICD-10-CM

## 2025-02-23 DIAGNOSIS — H57.11 EYE PAIN, RIGHT: ICD-10-CM

## 2025-02-23 PROCEDURE — 99284 EMERGENCY DEPT VISIT MOD MDM: CPT

## 2025-02-23 PROCEDURE — 99204 OFFICE O/P NEW MOD 45 MIN: CPT | Performed by: PHYSICIAN ASSISTANT

## 2025-02-23 PROCEDURE — 1159F MED LIST DOCD IN RCRD: CPT | Performed by: PHYSICIAN ASSISTANT

## 2025-02-23 PROCEDURE — 1123F ACP DISCUSS/DSCN MKR DOCD: CPT | Performed by: PHYSICIAN ASSISTANT

## 2025-02-23 PROCEDURE — 99283 EMERGENCY DEPT VISIT LOW MDM: CPT | Performed by: EMERGENCY MEDICINE

## 2025-02-23 PROCEDURE — 3008F BODY MASS INDEX DOCD: CPT | Performed by: PHYSICIAN ASSISTANT

## 2025-02-23 PROCEDURE — 99284 EMERGENCY DEPT VISIT MOD MDM: CPT | Performed by: EMERGENCY MEDICINE

## 2025-02-23 PROCEDURE — 2500000001 HC RX 250 WO HCPCS SELF ADMINISTERED DRUGS (ALT 637 FOR MEDICARE OP)

## 2025-02-23 RX ORDER — IBUPROFEN 600 MG/1
600 TABLET ORAL ONCE
Status: COMPLETED | OUTPATIENT
Start: 2025-02-23 | End: 2025-02-23

## 2025-02-23 RX ORDER — IBUPROFEN 600 MG/1
600 TABLET ORAL EVERY 6 HOURS PRN
Qty: 28 TABLET | Refills: 0 | Status: SHIPPED | OUTPATIENT
Start: 2025-02-23 | End: 2025-03-02

## 2025-02-23 RX ADMIN — IBUPROFEN 600 MG: 600 TABLET, FILM COATED ORAL at 20:26

## 2025-02-23 ASSESSMENT — COLUMBIA-SUICIDE SEVERITY RATING SCALE - C-SSRS
2. HAVE YOU ACTUALLY HAD ANY THOUGHTS OF KILLING YOURSELF?: NO
1. IN THE PAST MONTH, HAVE YOU WISHED YOU WERE DEAD OR WISHED YOU COULD GO TO SLEEP AND NOT WAKE UP?: NO

## 2025-02-23 ASSESSMENT — PAIN DESCRIPTION - LOCATION: LOCATION: EYE

## 2025-02-23 ASSESSMENT — VISUAL ACUITY
OU: 20/40
OD: 20/30
OS: 20/70

## 2025-02-23 ASSESSMENT — PAIN SCALES - GENERAL: PAINLEVEL_OUTOF10: 10 - WORST POSSIBLE PAIN

## 2025-02-23 NOTE — ED TRIAGE NOTES
Patient C/O bilateral eye pain. States his eyes are sore, water, and itch. States there is draining come from his eyes. Patient c/o blurry vision. Was seen at urgent care but they wanted him to come to ED for evaluation.

## 2025-02-23 NOTE — PROGRESS NOTES
Subjective   Patient ID: Joseph Hair is a 64 y.o. male. They present today with a chief complaint of Eye Problem (Itching ).    History of Present Illness  Patient is a pleasant 64-year-old white male, past medical history of lung cancer status post right pneumoectomy and status post chemoradiation, DVT no longer on anticoagulation, presented to clinic with chief complaint of eye irritation.  Patient is reporting 2 to 3-day history of bilateral eye pain, right greater than left.  He does report a previous traumatic injury to his left eye in which a nail went through the eye and he endorses very poor vision in that eye at that time.  He is presenting with complaints of blurry vision now in his right eye with associated burning of the eyes and excessive tearing.  He denies any foreign body or injury to the eye.  Denies any light sensitivity.  Denies any pain with extraocular movements.  No swelling of the orbits.      Eye Problem      Past Medical History  Allergies as of 02/23/2025    (No Known Allergies)       (Not in a hospital admission)         Past Medical History:   Diagnosis Date    Chronic obstructive pulmonary disease, unspecified     Chronic obstructive pulmonary disease    COVID-19 11/15/2021    COVID-19 virus infection    Drug induced akathisia 09/20/2019    Akathisia    Drug induced subacute dyskinesia 09/20/2019    Tardive dyskinesia    Emphysema, unspecified     Emphysema    Guillain-[place]     per pt    Other fatigue 11/17/2020    Chemotherapy-induced fatigue    Personal history of other diseases of the respiratory system     History of sinusitis    Personal history of other diseases of the respiratory system 04/13/2020    History of acute bronchitis    Personal history of other diseases of the respiratory system     History of chronic bronchitis    Personal history of other malignant neoplasm of bronchus and lung     History of malignant neoplasm of lung    Personal history of other mental and  "behavioral disorders     History of attention deficit hyperactivity disorder    Unspecified injury of left eye and orbit, initial encounter     Left eye trauma       Past Surgical History:   Procedure Laterality Date    EYE SURGERY  01/27/2015    Eye Surgery    OTHER SURGICAL HISTORY  10/24/2013    Bronchoscopy (Diagnostic)    US GUIDED BIOPSY LYMPH NODE SUPERFICIAL  11/6/2014    US GUIDED BIOPSY LYMPH NODE SUPERFICIAL 11/6/2014 [facility]        reports that he has been smoking cigarettes. He has never used smokeless tobacco. He reports current alcohol use of about 1.0 standard drink of alcohol per week. He reports current drug use. Drug: Marijuana.    Review of Systems  Review of Systems                               Objective    Vitals:    02/23/25 1538   BP: 108/75   Pulse: 93   Resp: 18   Temp: 36.4 °C (97.6 °F)   TempSrc: Oral   SpO2: 99%   Weight: 56.7 kg (125 lb)   Height: 1.727 m (5' 8\")     No LMP for male patient.    Physical Exam  General: Vitals Noted. No distress. Normocephalic.     HEENT: Pupil sizes are not equal upon examination with the left pupil appearing much larger than the right.  Right pupil is approximately 1 mm.  EOMs are intact and painless.  The right pupil does not appear to be very reactive to light.  Provide do appear normal.  TMs normal, patent nares, Normal OP    Neck: Supple with no adenopathy.     Cardiac: Regular Rate and Rhythm. No murmur.     Pulmonary: Equal breath sounds bilaterally. No wheezes, rhonchi, or rales.    Abdomen: Soft, non-tender, with normal bowel sounds.     Musculoskeletal: Moves all extremities, no effusion, no edema.     Skin: No obvious rashes.  Procedures    Point of Care Test & Imaging Results from this visit    No results found.    Diagnostic study results (if any) were reviewed by Rufino Olson PA-C.    Assessment/Plan   Allergies, medications, history, and pertinent labs/EKGs/Imaging reviewed by Rufino Olson PA-C.     Medical Decision " Making  Patient was seen eval in the clinic with complaint of bilateral eye irritation and blurry vision developing in the right eye x 2 to 3 days.  On exam patient is nontoxic appearing respite comfortably no acute distress.  Vital signs are stable, afebrile.  Chest is clear, heart is regular, belly is obese soft and nontender but evaluation of the eyes as above.  Given patient's extensive medical history with history of DVT no longer on anticoagulation along with essentially monocular vision as he reports very poor vision in the left eye I feel he warrants further assessment in the emergency department to rule out any acute intraocular etiologies.  I advised to report to [facility] for further evaluation by ophthalmology.  I has a conversation with the patient regarding my impression plan and reasoning plan of care.  States his wife will drive him down    Orders and Diagnoses  Diagnoses and all orders for this visit:  Blurry vision, right eye  Eye pain, right        Medical Admin Record      Follow Up Instructions  No follow-ups on file.    Patient disposition: ED    Electronically signed by Rufino Olson PA-C  4:05 PM

## 2025-02-23 NOTE — ED PROVIDER NOTES
HPI   Chief Complaint   Patient presents with    Eye Pain       HPI  Patient is a 64-year-old male with a past medical history of right lobectomy due to cancer, Guillain-Barré, DVT (not on anticoagulation), and COPD who reports to the emergency department for concerns of bilateral eye pain.  Patient states that he has irritation in both eyes and blurry vision that is worsening in his right eye over the past 3-4 days.  Patient states that he already cannot see out of his left eye to having to have surgery on the eye following in a [place] by a nail.  Patient states that his right eye has had on and off blurry vision for the past couple days associated pain.  He states that he has had crustiness over his eyes upon awakening over the past couple days, as well.  Patient denies photophobia, pain with eye motion, pain to the temporal region, headaches, tinnitus.  Patient states that he tried some drops that his wife gave him as well as some Tylenol, which helped a little bit.  Patient denies any fevers, recent illnesses, chest pain, shortness of breath, abdominal pain, nausea or vomiting.  Patient does not state that he has chronic pain mostly related to his Guillain-Barré syndrome.  Patient denies any recent changes in medications or history of pain in his right eye.  Patient states that he went to urgent care and the doctor was concerned with his story and sent him to [facility] [place] for an ophthalmology follow-up.      Patient History   Past Medical History:   Diagnosis Date    Chronic obstructive pulmonary disease, unspecified     Chronic obstructive pulmonary disease    COVID-19 11/15/2021    COVID-19 virus infection    Drug induced akathisia 09/20/2019    Akathisia    Drug induced subacute dyskinesia 09/20/2019    Tardive dyskinesia    Emphysema, unspecified     Emphysema    Guillain-[place]     per pt    Other fatigue 11/17/2020    Chemotherapy-induced fatigue    Personal history of other diseases of the respiratory system      History of sinusitis    Personal history of other diseases of the respiratory system 04/13/2020    History of acute bronchitis    Personal history of other diseases of the respiratory system     History of chronic bronchitis    Personal history of other malignant neoplasm of bronchus and lung     History of malignant neoplasm of lung    Personal history of other mental and behavioral disorders     History of attention deficit hyperactivity disorder    Unspecified injury of left eye and orbit, initial encounter     Left eye trauma     Past Surgical History:   Procedure Laterality Date    EYE SURGERY  01/27/2015    Eye Surgery    OTHER SURGICAL HISTORY  10/24/2013    Bronchoscopy (Diagnostic)    US GUIDED BIOPSY LYMPH NODE SUPERFICIAL  11/6/2014    US GUIDED BIOPSY LYMPH NODE SUPERFICIAL 11/6/2014 [facility] [place] [facility]     No family history on file.  Social History     Tobacco Use    Smoking status: Every Day     Current packs/day: 0.25     Types: Cigarettes    Smokeless tobacco: Never   Substance Use Topics    Alcohol use: Yes     Alcohol/week: 1.0 standard drink of alcohol     Types: 1 Shots of liquor per week     Comment: 1/27/2025 - patient stated he has started drinking again.    Drug use: Yes     Types: Marijuana       Physical Exam   ED Triage Vitals [02/23/25 1719]   Temperature Heart Rate Respirations BP   36.6 °C (97.9 °F) 95 18 115/82      Pulse Ox Temp Source Heart Rate Source Patient Position   98 % Oral Monitor --      BP Location FiO2 (%)     -- --       Physical Exam  Vitals and nursing note reviewed.   Constitutional:       General: He is not in acute distress.     Appearance: He is well-developed.   HENT:      Head: Normocephalic and atraumatic.   Eyes:      Extraocular Movements: Extraocular movements intact.      Conjunctiva/sclera: Conjunctivae normal.      Comments: Extraocular motions intact without pain to extraocular motion  Visual acuity 20/25 with both eyes, 20/25 with right eye, left eye unable to  read top line  No photophobia  Right eye not injected and no evidence of chemosis or exudates.   Cardiovascular:      Rate and Rhythm: Normal rate and regular rhythm.      Heart sounds: No murmur heard.  Pulmonary:      Effort: Pulmonary effort is normal. No respiratory distress.      Breath sounds: Normal breath sounds.   Abdominal:      Palpations: Abdomen is soft.      Tenderness: There is no abdominal tenderness.   Musculoskeletal:         General: No swelling.      Cervical back: Neck supple.   Skin:     General: Skin is warm and dry.      Capillary Refill: Capillary refill takes less than 2 seconds.   Neurological:      Mental Status: He is alert.   Psychiatric:         Mood and Affect: Mood normal.       ED Course & MDM   Diagnoses as of 02/23/25 2103   Blurry vision, right eye   Eye pain, right       Medical Decision Making  Patient is a 64-year-old male with a past medical history of right lobectomy due to cancer, Guillain-Barré, DVT, and COPD who reports to the emergency department for concerns of bilateral eye pain.  Patient's vitals were stable and within normal limits upon presentation.  Patient's sclera were not injected and there is no evidence of chemosis.  There is also no evidence of photophobia or pain with extraocular motion.  Patient's vision was 20/25 with both eyes and with the right eye.  Ocular pressures were not able to be obtained in the emergency department as a Jerry-Pen could not be found.  Ophthalmology was consulted and agreed to see the patient.  Ophthalmology stated they did not find any evidence of any acute or concerning pathology.  They stated that the patient could be discharged with follow-up in 1 week at the clinic.  Patient given contact information for the ophthalmology clinic.  Patient given a prescription for ibuprofen for pain control.  Patient discharged in good condition with strict return precautions.  Patient understood and was agreeable with the  plan.    Procedure  Procedures none     Fredrick Ordoñez DO  Resident  02/23/25 0685

## 2025-02-24 NOTE — DISCHARGE INSTRUCTIONS
You were seen in the emergency department today for concerns of blurry vision.  A thorough examination was performed by the ophthalmologist.  Did not find any acute emergent findings.  They stated that you can follow-up with their clinic in 1 week.  Should you need to contact them in the meantime, they can be reached at [phone].  A prescription for ibuprofen for pain control has been sent to your pharmacy.  Please take as described.  You are safely discharged at this time.  However, should you have any new, worsening, or concerning symptoms please report back to the emergency department.

## 2025-02-24 NOTE — CONSULTS
"Reason for consult  \"Right eye pain, drainage\"    History Of Present Illness  [place] Hair is a 64 y.o. 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     Patient is resting comfortably in chair, in no acute distress. He reports acute onset of bilateral 10/10 eye pain, tearing/crusty eyelids most noticeable upon awakening, photophobia, and blurry vision for the past 4 days. He states these symptoms have been persistent without fluctuation. He does not recall any inciting event. He has not tried any pain medications at home. He denies flashes, floaters, diplopia, curtain in vision, sudden vision loss, recent ocular trauma.    Of note, patient has a history of EtOH/cocaine/opioid use disorder. He states that he was sober for 30 years but recently started drinking again because \"no one would give me pain meds\". He reports having 5-6 drinks a few times a week. Per ED provider, patient was asking for opioids for pain management upon arrival.    Wife had pneumonia a couple weeks ago, and grandson has been sick with contact within the past week. Patient has chronic runny nose, but denies fever, chills, headache.    Last eye exam was \"years ago\". He has a history of penetrating trauma with nail to OS over 30 years ago and states that he is \"blind\" in that eye. He has also had \"a ton of plastic surgery\" to his face but would not elaborate further. Per chart review, he underwent CEIOL OS with Dr. Kent 3/11/15, last visit 4/21/15 with [facility] 20/15 OD and 20/20 OS.     Past Medical History  He has a past medical history of Chronic obstructive pulmonary disease, unspecified, COVID-19 (11/15/2021), Drug induced akathisia (09/20/2019), Drug induced subacute dyskinesia (09/20/2019), Emphysema, unspecified, Guillain-[place], " Other fatigue (11/17/2020), Personal history of other diseases of the respiratory system, Personal history of other diseases of the respiratory system (04/13/2020), Personal history of other diseases of the respiratory system, Personal history of other malignant neoplasm of bronchus and lung, Personal history of other mental and behavioral disorders, and Unspecified injury of left eye and orbit, initial encounter.    Family History: reviewed and not pertinent to chief complaint  Medications: please refer to medication reconciliation  Allergies: please refer to patient allergy list    Physical Exam  Base Eye Exam       Visual Acuity (Vinod Cards)         Right Left    Near sc 20/20 20/30 PH 20/20              Tonometry (Tonopen, 7:10 PM)         Right Left    Pressure 12 21              Pupils         Dark Light Shape React APD    Right 2 1 Round Brisk None    Left 7 7 Irregular Nonreactive None by reverse              Visual Fields         Left Right     Full Full              Extraocular Movement         Right Left     Full, Ortho Full, Ortho              Neuro/Psych       Oriented x3: Yes    Mood/Affect: Normal              Dilation       Both eyes: 1% Mydriacyl & 2.5% Jimmie  @ 7:05 PM                  Additional Tests       Color         Right Left    Ish[place] 11/11 11/11                  Slit Lamp and Fundus Exam       External Exam         Right Left    External Normal Normal              Slit Lamp Exam         Right Left    Lids/Lashes Normal Normal    Conjunctiva/Sclera White and quiet White and quiet    Cornea Clear Clear    Anterior Chamber Deep and quiet Deep and quiet    Iris Round and reactive Corectopia with SN displacement, white fibrous strand extending from temporal iris to limbus, N/T iris atrophy    Lens Trace NS PCIOL    Anterior Vitreous Normal Normal              Fundus Exam         Right Left    Disc Normal Normal    C/D Ratio 0.3 0.5    Macula Normal Normal    Vessels Normal Normal    Periphery  Normal Normal                  Assessment/Plan    Vision excellent both eyes today with traumatic/fixed pupil OS and high-normal IOP OS, full motility/fields/color. Exam showed previously  noted corectopia OS with PCIOL and slight larger C/D 0.5. There are no signs of infection or inflammation, and no organic etiology to support patient's reported symptoms of severe eye pain, tearing/crusty eyelids, or photophobia, which were noticeably absent during the exam.    I recommend pain control with OTC Tylenol or ibuprofen, which patient is amenable to. Recommend follow-up with  [facility] within 1 week for refraction. Please call [phone] ([phone]) to make appointment.    Romario Jama MD  Ophthalmology PGY-2    Ophthalmology Adult Pager - [phone]  Ophthalmology Pediatrics Pager - [phone]     For adult follow-up appointments, call: [phone]  For pediatric follow-up appointments, call: [phone]    Note finalized upon attending signature.

## 2025-04-08 ENCOUNTER — APPOINTMENT (OUTPATIENT)
Dept: PAIN MEDICINE | Facility: CLINIC | Age: 65
End: 2025-04-08
Payer: MEDICARE

## 2025-04-08 ENCOUNTER — TELEPHONE (OUTPATIENT)
Dept: PAIN MEDICINE | Facility: CLINIC | Age: 65
End: 2025-04-08

## 2025-04-08 NOTE — TELEPHONE ENCOUNTER
Left message with patient to call the office to reschedule scrambler therapy in [place] with [facility]. Advised patient that at this time we are scheduling out until late July early August.

## 2025-04-09 ENCOUNTER — APPOINTMENT (OUTPATIENT)
Dept: PAIN MEDICINE | Facility: CLINIC | Age: 65
End: 2025-04-09
Payer: MEDICARE

## 2025-04-10 ENCOUNTER — APPOINTMENT (OUTPATIENT)
Dept: PAIN MEDICINE | Facility: CLINIC | Age: 65
End: 2025-04-10
Payer: MEDICARE

## 2025-04-11 ENCOUNTER — APPOINTMENT (OUTPATIENT)
Dept: PAIN MEDICINE | Facility: CLINIC | Age: 65
End: 2025-04-11
Payer: MEDICARE

## 2025-04-14 ENCOUNTER — APPOINTMENT (OUTPATIENT)
Dept: PAIN MEDICINE | Facility: CLINIC | Age: 65
End: 2025-04-14
Payer: MEDICARE

## 2025-04-15 ENCOUNTER — APPOINTMENT (OUTPATIENT)
Dept: PAIN MEDICINE | Facility: CLINIC | Age: 65
End: 2025-04-15
Payer: MEDICARE

## 2025-04-16 ENCOUNTER — APPOINTMENT (OUTPATIENT)
Dept: PAIN MEDICINE | Facility: CLINIC | Age: 65
End: 2025-04-16
Payer: MEDICARE

## 2025-04-17 ENCOUNTER — APPOINTMENT (OUTPATIENT)
Dept: PAIN MEDICINE | Facility: CLINIC | Age: 65
End: 2025-04-17
Payer: MEDICARE

## 2025-04-18 ENCOUNTER — APPOINTMENT (OUTPATIENT)
Dept: PAIN MEDICINE | Facility: CLINIC | Age: 65
End: 2025-04-18
Payer: MEDICARE

## 2025-04-21 ENCOUNTER — APPOINTMENT (OUTPATIENT)
Dept: PAIN MEDICINE | Facility: CLINIC | Age: 65
End: 2025-04-21
Payer: MEDICARE

## 2025-04-22 ENCOUNTER — APPOINTMENT (OUTPATIENT)
Dept: PAIN MEDICINE | Facility: CLINIC | Age: 65
End: 2025-04-22
Payer: MEDICARE

## 2025-04-23 ENCOUNTER — APPOINTMENT (OUTPATIENT)
Dept: PAIN MEDICINE | Facility: CLINIC | Age: 65
End: 2025-04-23
Payer: MEDICARE

## 2025-04-24 ENCOUNTER — APPOINTMENT (OUTPATIENT)
Dept: PAIN MEDICINE | Facility: CLINIC | Age: 65
End: 2025-04-24
Payer: MEDICARE

## 2025-04-25 ENCOUNTER — APPOINTMENT (OUTPATIENT)
Dept: PAIN MEDICINE | Facility: CLINIC | Age: 65
End: 2025-04-25
Payer: MEDICARE

## 2025-04-28 ENCOUNTER — APPOINTMENT (OUTPATIENT)
Dept: PAIN MEDICINE | Facility: CLINIC | Age: 65
End: 2025-04-28
Payer: MEDICARE

## 2025-04-29 ENCOUNTER — APPOINTMENT (OUTPATIENT)
Dept: PAIN MEDICINE | Facility: CLINIC | Age: 65
End: 2025-04-29
Payer: MEDICARE

## 2025-04-30 ENCOUNTER — APPOINTMENT (OUTPATIENT)
Dept: PAIN MEDICINE | Facility: CLINIC | Age: 65
End: 2025-04-30
Payer: MEDICARE

## 2025-05-01 ENCOUNTER — APPOINTMENT (OUTPATIENT)
Dept: PAIN MEDICINE | Facility: CLINIC | Age: 65
End: 2025-05-01
Payer: MEDICARE

## 2025-05-02 ENCOUNTER — APPOINTMENT (OUTPATIENT)
Dept: PAIN MEDICINE | Facility: CLINIC | Age: 65
End: 2025-05-02
Payer: MEDICARE

## 2025-07-28 ENCOUNTER — OFFICE VISIT (OUTPATIENT)
Dept: PRIMARY CARE | Facility: CLINIC | Age: 65
End: 2025-07-28
Payer: MEDICARE

## 2025-07-28 VITALS
SYSTOLIC BLOOD PRESSURE: 126 MMHG | DIASTOLIC BLOOD PRESSURE: 86 MMHG | HEIGHT: 68 IN | BODY MASS INDEX: 19.1 KG/M2 | WEIGHT: 126 LBS | OXYGEN SATURATION: 95 % | HEART RATE: 62 BPM | TEMPERATURE: 97.9 F

## 2025-07-28 DIAGNOSIS — R91.1 LUNG NODULE: ICD-10-CM

## 2025-07-28 DIAGNOSIS — F17.200 NICOTINE DEPENDENCE, UNCOMPLICATED, UNSPECIFIED NICOTINE PRODUCT TYPE: ICD-10-CM

## 2025-07-28 DIAGNOSIS — G61.0: Primary | ICD-10-CM

## 2025-07-28 DIAGNOSIS — R26.81 UNSTEADY GAIT: ICD-10-CM

## 2025-07-28 DIAGNOSIS — T14.8XXA NERVE DAMAGE: ICD-10-CM

## 2025-07-28 DIAGNOSIS — M25.512 ACUTE PAIN OF LEFT SHOULDER: ICD-10-CM

## 2025-07-28 DIAGNOSIS — G62.9 NEUROPATHY: ICD-10-CM

## 2025-07-28 PROCEDURE — 1159F MED LIST DOCD IN RCRD: CPT | Performed by: REGISTERED NURSE

## 2025-07-28 PROCEDURE — 99214 OFFICE O/P EST MOD 30 MIN: CPT | Performed by: REGISTERED NURSE

## 2025-07-28 PROCEDURE — 3008F BODY MASS INDEX DOCD: CPT | Performed by: REGISTERED NURSE

## 2025-07-28 PROCEDURE — 1125F AMNT PAIN NOTED PAIN PRSNT: CPT | Performed by: REGISTERED NURSE

## 2025-07-28 PROCEDURE — G2211 COMPLEX E/M VISIT ADD ON: HCPCS | Performed by: REGISTERED NURSE

## 2025-07-28 RX ORDER — GABAPENTIN 600 MG/1
600 TABLET ORAL 3 TIMES DAILY
Qty: 90 TABLET | Refills: 2 | Status: SHIPPED | OUTPATIENT
Start: 2025-07-28 | End: 2025-10-26

## 2025-07-28 ASSESSMENT — PATIENT HEALTH QUESTIONNAIRE - PHQ9
SUM OF ALL RESPONSES TO PHQ QUESTIONS 1-9: 25
3. TROUBLE FALLING OR STAYING ASLEEP OR SLEEPING TOO MUCH: NEARLY EVERY DAY
SUM OF ALL RESPONSES TO PHQ9 QUESTIONS 1 AND 2: 6
6. FEELING BAD ABOUT YOURSELF - OR THAT YOU ARE A FAILURE OR HAVE LET YOURSELF OR YOUR FAMILY DOWN: NEARLY EVERY DAY
7. TROUBLE CONCENTRATING ON THINGS, SUCH AS READING THE NEWSPAPER OR WATCHING TELEVISION: SEVERAL DAYS
2. FEELING DOWN, DEPRESSED OR HOPELESS: NEARLY EVERY DAY
4. FEELING TIRED OR HAVING LITTLE ENERGY: NEARLY EVERY DAY
9. THOUGHTS THAT YOU WOULD BE BETTER OFF DEAD, OR OF HURTING YOURSELF: NEARLY EVERY DAY
8. MOVING OR SPEAKING SO SLOWLY THAT OTHER PEOPLE COULD HAVE NOTICED. OR THE OPPOSITE, BEING SO FIGETY OR RESTLESS THAT YOU HAVE BEEN MOVING AROUND A LOT MORE THAN USUAL: NEARLY EVERY DAY
5. POOR APPETITE OR OVEREATING: NEARLY EVERY DAY
1. LITTLE INTEREST OR PLEASURE IN DOING THINGS: NEARLY EVERY DAY

## 2025-07-28 ASSESSMENT — PAIN SCALES - GENERAL: PAINLEVEL_OUTOF10: 4

## 2025-07-28 NOTE — PROGRESS NOTES
"Subjective   Patient ID: [place] Hair is a 64 y.o. male who presents for Shoulder Pain (Pt c/o left shoulder pain x 2 weeks. Pt states shoulder is starting to get better but was very painful for a while. ).    HPI   Pt here for left shoulder pain which he reports is improving  Review of Systems   All other systems reviewed and are negative.      Objective   /86   Pulse 62   Temp 36.6 °C (97.9 °F)   Ht 1.727 m (5' 8\")   Wt 57.2 kg (126 lb)   SpO2 95%   BMI 19.16 kg/m²     Physical Exam  Vitals reviewed.   Constitutional:       Appearance: Normal appearance.     Cardiovascular:      Rate and Rhythm: Normal rate.      Pulses: Normal pulses.   Pulmonary:      Effort: Pulmonary effort is normal.     Musculoskeletal:         General: Normal range of motion.     Neurological:      Mental Status: He is alert.     Psychiatric:         Mood and Affect: Mood normal.         Behavior: Behavior normal.         Assessment/Plan          "